# Patient Record
Sex: MALE | Race: WHITE | NOT HISPANIC OR LATINO | URBAN - METROPOLITAN AREA
[De-identification: names, ages, dates, MRNs, and addresses within clinical notes are randomized per-mention and may not be internally consistent; named-entity substitution may affect disease eponyms.]

---

## 2021-04-21 ENCOUNTER — OUTPATIENT (OUTPATIENT)
Dept: OUTPATIENT SERVICES | Facility: HOSPITAL | Age: 61
LOS: 1 days | End: 2021-04-21
Payer: COMMERCIAL

## 2021-04-21 VITALS
SYSTOLIC BLOOD PRESSURE: 127 MMHG | TEMPERATURE: 98 F | OXYGEN SATURATION: 99 % | HEIGHT: 71 IN | DIASTOLIC BLOOD PRESSURE: 78 MMHG | RESPIRATION RATE: 16 BRPM | HEART RATE: 54 BPM | WEIGHT: 195.11 LBS

## 2021-04-21 DIAGNOSIS — Z98.1 ARTHRODESIS STATUS: Chronic | ICD-10-CM

## 2021-04-21 DIAGNOSIS — Z98.890 OTHER SPECIFIED POSTPROCEDURAL STATES: Chronic | ICD-10-CM

## 2021-04-21 DIAGNOSIS — Z29.9 ENCOUNTER FOR PROPHYLACTIC MEASURES, UNSPECIFIED: ICD-10-CM

## 2021-04-21 DIAGNOSIS — Z01.818 ENCOUNTER FOR OTHER PREPROCEDURAL EXAMINATION: ICD-10-CM

## 2021-04-21 DIAGNOSIS — Z90.89 ACQUIRED ABSENCE OF OTHER ORGANS: Chronic | ICD-10-CM

## 2021-04-21 DIAGNOSIS — M25.512 PAIN IN LEFT SHOULDER: ICD-10-CM

## 2021-04-21 LAB
A1C WITH ESTIMATED AVERAGE GLUCOSE RESULT: 5.5 % — SIGNIFICANT CHANGE UP (ref 4–5.6)
ALBUMIN SERPL ELPH-MCNC: 3.8 G/DL — SIGNIFICANT CHANGE UP (ref 3.3–5)
ANION GAP SERPL CALC-SCNC: 5 MMOL/L — SIGNIFICANT CHANGE UP (ref 5–17)
BASOPHILS # BLD AUTO: 0.05 K/UL — SIGNIFICANT CHANGE UP (ref 0–0.2)
BASOPHILS NFR BLD AUTO: 1.1 % — SIGNIFICANT CHANGE UP (ref 0–2)
BUN SERPL-MCNC: 6 MG/DL — LOW (ref 7–23)
CALCIUM SERPL-MCNC: 8.6 MG/DL — SIGNIFICANT CHANGE UP (ref 8.5–10.1)
CHLORIDE SERPL-SCNC: 98 MMOL/L — SIGNIFICANT CHANGE UP (ref 96–108)
CO2 SERPL-SCNC: 28 MMOL/L — SIGNIFICANT CHANGE UP (ref 22–31)
CREAT SERPL-MCNC: 0.78 MG/DL — SIGNIFICANT CHANGE UP (ref 0.5–1.3)
EOSINOPHIL # BLD AUTO: 0.13 K/UL — SIGNIFICANT CHANGE UP (ref 0–0.5)
EOSINOPHIL NFR BLD AUTO: 3 % — SIGNIFICANT CHANGE UP (ref 0–6)
ESTIMATED AVERAGE GLUCOSE: 111 MG/DL — SIGNIFICANT CHANGE UP (ref 68–114)
GLUCOSE SERPL-MCNC: 92 MG/DL — SIGNIFICANT CHANGE UP (ref 70–99)
HCT VFR BLD CALC: 40.4 % — SIGNIFICANT CHANGE UP (ref 39–50)
HGB BLD-MCNC: 13.6 G/DL — SIGNIFICANT CHANGE UP (ref 13–17)
IMM GRANULOCYTES NFR BLD AUTO: 0.2 % — SIGNIFICANT CHANGE UP (ref 0–1.5)
INR BLD: 0.96 RATIO — SIGNIFICANT CHANGE UP (ref 0.88–1.16)
LYMPHOCYTES # BLD AUTO: 1.17 K/UL — SIGNIFICANT CHANGE UP (ref 1–3.3)
LYMPHOCYTES # BLD AUTO: 26.6 % — SIGNIFICANT CHANGE UP (ref 13–44)
MCHC RBC-ENTMCNC: 30.3 PG — SIGNIFICANT CHANGE UP (ref 27–34)
MCHC RBC-ENTMCNC: 33.7 GM/DL — SIGNIFICANT CHANGE UP (ref 32–36)
MCV RBC AUTO: 90 FL — SIGNIFICANT CHANGE UP (ref 80–100)
MONOCYTES # BLD AUTO: 0.56 K/UL — SIGNIFICANT CHANGE UP (ref 0–0.9)
MONOCYTES NFR BLD AUTO: 12.7 % — SIGNIFICANT CHANGE UP (ref 2–14)
NEUTROPHILS # BLD AUTO: 2.48 K/UL — SIGNIFICANT CHANGE UP (ref 1.8–7.4)
NEUTROPHILS NFR BLD AUTO: 56.4 % — SIGNIFICANT CHANGE UP (ref 43–77)
PLATELET # BLD AUTO: 293 K/UL — SIGNIFICANT CHANGE UP (ref 150–400)
POTASSIUM SERPL-MCNC: 4.5 MMOL/L — SIGNIFICANT CHANGE UP (ref 3.5–5.3)
POTASSIUM SERPL-SCNC: 4.5 MMOL/L — SIGNIFICANT CHANGE UP (ref 3.5–5.3)
PROTHROM AB SERPL-ACNC: 11.3 SEC — SIGNIFICANT CHANGE UP (ref 10.6–13.6)
RBC # BLD: 4.49 M/UL — SIGNIFICANT CHANGE UP (ref 4.2–5.8)
RBC # FLD: 12.3 % — SIGNIFICANT CHANGE UP (ref 10.3–14.5)
SODIUM SERPL-SCNC: 131 MMOL/L — LOW (ref 135–145)
WBC # BLD: 4.4 K/UL — SIGNIFICANT CHANGE UP (ref 3.8–10.5)
WBC # FLD AUTO: 4.4 K/UL — SIGNIFICANT CHANGE UP (ref 3.8–10.5)

## 2021-04-21 PROCEDURE — 71046 X-RAY EXAM CHEST 2 VIEWS: CPT

## 2021-04-21 PROCEDURE — 93005 ELECTROCARDIOGRAM TRACING: CPT

## 2021-04-21 PROCEDURE — 83036 HEMOGLOBIN GLYCOSYLATED A1C: CPT

## 2021-04-21 PROCEDURE — 87641 MR-STAPH DNA AMP PROBE: CPT

## 2021-04-21 PROCEDURE — 36415 COLL VENOUS BLD VENIPUNCTURE: CPT

## 2021-04-21 PROCEDURE — 87640 STAPH A DNA AMP PROBE: CPT

## 2021-04-21 PROCEDURE — 85610 PROTHROMBIN TIME: CPT

## 2021-04-21 PROCEDURE — 82040 ASSAY OF SERUM ALBUMIN: CPT

## 2021-04-21 PROCEDURE — 85025 COMPLETE CBC W/AUTO DIFF WBC: CPT

## 2021-04-21 PROCEDURE — 86901 BLOOD TYPING SEROLOGIC RH(D): CPT

## 2021-04-21 PROCEDURE — 93010 ELECTROCARDIOGRAM REPORT: CPT

## 2021-04-21 PROCEDURE — 80048 BASIC METABOLIC PNL TOTAL CA: CPT

## 2021-04-21 PROCEDURE — 86850 RBC ANTIBODY SCREEN: CPT

## 2021-04-21 PROCEDURE — 71046 X-RAY EXAM CHEST 2 VIEWS: CPT | Mod: 26

## 2021-04-21 PROCEDURE — G0463: CPT | Mod: 25

## 2021-04-21 PROCEDURE — 86900 BLOOD TYPING SEROLOGIC ABO: CPT

## 2021-04-21 NOTE — H&P PST ADULT - NSICDXPASTSURGICALHX_GEN_ALL_CORE_FT
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

## 2021-04-21 NOTE — H&P PST ADULT - HEALTH CARE MAINTENANCE
covid vaccine   [place] 14 days Pt denies  travel internationally for the past 21 days

## 2021-04-21 NOTE — H&P PST ADULT - HISTORY OF PRESENT ILLNESS
61 year old male with OA left s/p left TSR 2006 and revision 2009 ; Pt c/o loose hardware on left shoulder as well as pain and limited ROM; he presents to PST for planned Revision of Left total shoulder replacement

## 2021-04-21 NOTE — H&P PST ADULT - ASSESSMENT
61 year old male with OA left s/p left TSR  and revision  ; Pt c/o loose hardware on left shoulder as well as pain and limited ROM; he presents to PST for planned Revision of Left total shoulder replacement     Plan:  1. PST instructions given ; NPO status instructions to be given by ASU   2. Pt instructed to take following meds with sip of water : none   3. Pt instructed to take routine evening medications unless indicated   4. Stop NSAIDS ( Aspirin Alev Motrin Mobic Diclofenac), herbal supplements , MVI , Vitamin fish oil 7 days prior to surgery  unless   directed by surgeon or cardiologist;   5. Medical Optimization  with Dr Anup Barrera   6. EZ wash instructions given & mupirocin instructions given  7. Labs EKG CXR as per surgeon request   8. Pt instructed to self quarantine after Covid test   9. Covid Testing scheduled Pt notified and aware  10. Pt denies covid symptoms shortness of breath fever cough       CAPRINI SCORE [CLOT]    AGE RELATED RISK FACTORS                                                       MOBILITY RELATED FACTORS  [ ] Age 41-60 years                                            (1 Point)                  [ ] Bed rest                                                        (1 Point)  [ x] Age: 61-74 years                                           (2 Points)                 [ ] Plaster cast                                                   (2 Points)  [ ] Age= 75 years                                              (3 Points)                 [ ] Bed bound for more than 72 hours                 (2 Points)    DISEASE RELATED RISK FACTORS                                               GENDER SPECIFIC FACTORS  [ ] Edema in the lower extremities                       (1 Point)                  [ ] Pregnancy                                                     (1 Point)  [ ] Varicose veins                                               (1 Point)                  [ ] Post-partum < 6 weeks                                   (1 Point)             [ x] BMI > 25 Kg/m2                                            (1 Point)                  [ ] Hormonal therapy  or oral contraception          (1 Point)                 [ ] Sepsis (in the previous month)                        (1 Point)                  [ ] History of pregnancy complications                 (1 point)  [ ] Pneumonia or serious lung disease                                               [ ] Unexplained or recurrent                     (1 Point)           (in the previous month)                               (1 Point)  [ ] Abnormal pulmonary function test                     (1 Point)                 SURGERY RELATED RISK FACTORS  [ ] Acute myocardial infarction                              (1 Point)                 [ ]  Section                                             (1 Point)  [ ] Congestive heart failure (in the previous month)  (1 Point)               [ ] Minor surgery                                                  (1 Point)   [ ] Inflammatory bowel disease                             (1 Point)                 [ ] Arthroscopic surgery                                        (2 Points)  [ ] Central venous access                                      (2 Points)                [ ] General surgery lasting more than 45 minutes   (2 Points)       [ ] Stroke (in the previous month)                          (5 Points)               [ x] Elective arthroplasty                                         (5 Points)            ( ) presents and past malignancy                           (2 points)                                                                                                         HEMATOLOGY RELATED FACTORS                                                 TRAUMA RELATED RISK FACTORS  [ ] Prior episodes of VTE                                     (3 Points)                 [ ] Fracture of the hip, pelvis, or leg                       (5 Points)  [ ] Positive family history for VTE                         (3 Points)                 [ ] Acute spinal cord injury (in the previous month)  (5 Points)  [ ] Prothrombin 52713 A                                     (3 Points)                 [ ] Paralysis  (less than 1 month)                             (5 Points)  [ ] Factor V Leiden                                             (3 Points)                  [ ] Multiple Trauma within 1 month                        (5 Points)  [ ] Lupus anticoagulants                                     (3 Points)                                                           [ ] Anticardiolipin antibodies                               (3 Points)                                                       [ ] High homocysteine in the blood                      (3 Points)                                             [ ] Other congenital or acquired thrombophilia      (3 Points)                                                [ ] Heparin induced thrombocytopenia                  (3 Points)                                          Total Score [      8    ]

## 2021-04-22 DIAGNOSIS — Z01.818 ENCOUNTER FOR OTHER PREPROCEDURAL EXAMINATION: ICD-10-CM

## 2021-04-22 DIAGNOSIS — M25.512 PAIN IN LEFT SHOULDER: ICD-10-CM

## 2021-04-22 PROBLEM — M51.26 OTHER INTERVERTEBRAL DISC DISPLACEMENT, LUMBAR REGION: Chronic | Status: ACTIVE | Noted: 2021-04-21

## 2021-04-22 PROBLEM — M19.90 UNSPECIFIED OSTEOARTHRITIS, UNSPECIFIED SITE: Chronic | Status: ACTIVE | Noted: 2021-04-21

## 2021-04-22 LAB
MRSA PCR RESULT.: SIGNIFICANT CHANGE UP
S AUREUS DNA NOSE QL NAA+PROBE: SIGNIFICANT CHANGE UP

## 2021-04-27 ENCOUNTER — APPOINTMENT (OUTPATIENT)
Dept: DISASTER EMERGENCY | Facility: CLINIC | Age: 61
End: 2021-04-27

## 2021-04-27 DIAGNOSIS — Z01.818 ENCOUNTER FOR OTHER PREPROCEDURAL EXAMINATION: ICD-10-CM

## 2021-04-28 LAB — SARS-COV-2 N GENE NPH QL NAA+PROBE: NOT DETECTED

## 2021-04-30 ENCOUNTER — INPATIENT (INPATIENT)
Facility: HOSPITAL | Age: 61
LOS: 0 days | Discharge: HOME | DRG: 483 | End: 2021-05-01
Attending: ORTHOPAEDIC SURGERY | Admitting: ORTHOPAEDIC SURGERY
Payer: COMMERCIAL

## 2021-04-30 ENCOUNTER — RESULT REVIEW (OUTPATIENT)
Age: 61
End: 2021-04-30

## 2021-04-30 ENCOUNTER — TRANSCRIPTION ENCOUNTER (OUTPATIENT)
Age: 61
End: 2021-04-30

## 2021-04-30 VITALS
RESPIRATION RATE: 16 BRPM | WEIGHT: 195.11 LBS | HEIGHT: 61 IN | SYSTOLIC BLOOD PRESSURE: 120 MMHG | DIASTOLIC BLOOD PRESSURE: 82 MMHG | OXYGEN SATURATION: 98 % | TEMPERATURE: 97 F | HEART RATE: 51 BPM

## 2021-04-30 DIAGNOSIS — Z98.890 OTHER SPECIFIED POSTPROCEDURAL STATES: Chronic | ICD-10-CM

## 2021-04-30 DIAGNOSIS — Z98.1 ARTHRODESIS STATUS: Chronic | ICD-10-CM

## 2021-04-30 DIAGNOSIS — M25.512 PAIN IN LEFT SHOULDER: ICD-10-CM

## 2021-04-30 DIAGNOSIS — Z90.89 ACQUIRED ABSENCE OF OTHER ORGANS: Chronic | ICD-10-CM

## 2021-04-30 LAB
ANION GAP SERPL CALC-SCNC: 4 MMOL/L — LOW (ref 5–17)
BUN SERPL-MCNC: 8 MG/DL — SIGNIFICANT CHANGE UP (ref 7–23)
CALCIUM SERPL-MCNC: 8.7 MG/DL — SIGNIFICANT CHANGE UP (ref 8.5–10.1)
CHLORIDE SERPL-SCNC: 105 MMOL/L — SIGNIFICANT CHANGE UP (ref 96–108)
CO2 SERPL-SCNC: 26 MMOL/L — SIGNIFICANT CHANGE UP (ref 22–31)
CREAT SERPL-MCNC: 0.69 MG/DL — SIGNIFICANT CHANGE UP (ref 0.5–1.3)
GLUCOSE SERPL-MCNC: 97 MG/DL — SIGNIFICANT CHANGE UP (ref 70–99)
HCT VFR BLD CALC: 34.6 % — LOW (ref 39–50)
HGB BLD-MCNC: 11.6 G/DL — LOW (ref 13–17)
MCHC RBC-ENTMCNC: 30.2 PG — SIGNIFICANT CHANGE UP (ref 27–34)
MCHC RBC-ENTMCNC: 33.5 GM/DL — SIGNIFICANT CHANGE UP (ref 32–36)
MCV RBC AUTO: 90.1 FL — SIGNIFICANT CHANGE UP (ref 80–100)
PLATELET # BLD AUTO: 226 K/UL — SIGNIFICANT CHANGE UP (ref 150–400)
POTASSIUM SERPL-MCNC: 4.6 MMOL/L — SIGNIFICANT CHANGE UP (ref 3.5–5.3)
POTASSIUM SERPL-SCNC: 4.6 MMOL/L — SIGNIFICANT CHANGE UP (ref 3.5–5.3)
RBC # BLD: 3.84 M/UL — LOW (ref 4.2–5.8)
RBC # FLD: 12.3 % — SIGNIFICANT CHANGE UP (ref 10.3–14.5)
SODIUM SERPL-SCNC: 135 MMOL/L — SIGNIFICANT CHANGE UP (ref 135–145)
WBC # BLD: 7.57 K/UL — SIGNIFICANT CHANGE UP (ref 3.8–10.5)
WBC # FLD AUTO: 7.57 K/UL — SIGNIFICANT CHANGE UP (ref 3.8–10.5)

## 2021-04-30 PROCEDURE — 99223 1ST HOSP IP/OBS HIGH 75: CPT

## 2021-04-30 PROCEDURE — 73030 X-RAY EXAM OF SHOULDER: CPT | Mod: 26,LT

## 2021-04-30 PROCEDURE — 73030 X-RAY EXAM OF SHOULDER: CPT | Mod: LT

## 2021-04-30 PROCEDURE — 88305 TISSUE EXAM BY PATHOLOGIST: CPT

## 2021-04-30 PROCEDURE — 23473 REVIS RECONST SHOULDER JOINT: CPT | Mod: AS

## 2021-04-30 PROCEDURE — C1889: CPT

## 2021-04-30 PROCEDURE — 80048 BASIC METABOLIC PNL TOTAL CA: CPT

## 2021-04-30 PROCEDURE — C1776: CPT

## 2021-04-30 PROCEDURE — C9399: CPT

## 2021-04-30 PROCEDURE — 88305 TISSUE EXAM BY PATHOLOGIST: CPT | Mod: 26

## 2021-04-30 PROCEDURE — 85027 COMPLETE CBC AUTOMATED: CPT

## 2021-04-30 PROCEDURE — 86769 SARS-COV-2 COVID-19 ANTIBODY: CPT

## 2021-04-30 PROCEDURE — 36415 COLL VENOUS BLD VENIPUNCTURE: CPT

## 2021-04-30 PROCEDURE — 99253 IP/OBS CNSLTJ NEW/EST LOW 45: CPT

## 2021-04-30 PROCEDURE — 97161 PT EVAL LOW COMPLEX 20 MIN: CPT | Mod: GP

## 2021-04-30 PROCEDURE — 82962 GLUCOSE BLOOD TEST: CPT

## 2021-04-30 RX ORDER — POLYETHYLENE GLYCOL 3350 17 G/17G
17 POWDER, FOR SOLUTION ORAL
Qty: 1 | Refills: 0
Start: 2021-04-30 | End: 2021-05-13

## 2021-04-30 RX ORDER — SODIUM CHLORIDE 9 MG/ML
1000 INJECTION, SOLUTION INTRAVENOUS
Refills: 0 | Status: DISCONTINUED | OUTPATIENT
Start: 2021-04-30 | End: 2021-04-30

## 2021-04-30 RX ORDER — PREGABALIN 225 MG/1
0 CAPSULE ORAL
Qty: 0 | Refills: 0 | DISCHARGE

## 2021-04-30 RX ORDER — PANTOPRAZOLE SODIUM 20 MG/1
40 TABLET, DELAYED RELEASE ORAL ONCE
Refills: 0 | Status: COMPLETED | OUTPATIENT
Start: 2021-04-30 | End: 2021-04-30

## 2021-04-30 RX ORDER — ONDANSETRON 8 MG/1
4 TABLET, FILM COATED ORAL ONCE
Refills: 0 | Status: DISCONTINUED | OUTPATIENT
Start: 2021-04-30 | End: 2021-04-30

## 2021-04-30 RX ORDER — ZINC SULFATE TAB 220 MG (50 MG ZINC EQUIVALENT) 220 (50 ZN) MG
0 TAB ORAL
Qty: 0 | Refills: 0 | DISCHARGE

## 2021-04-30 RX ORDER — FENTANYL CITRATE 50 UG/ML
50 INJECTION INTRAVENOUS
Refills: 0 | Status: DISCONTINUED | OUTPATIENT
Start: 2021-04-30 | End: 2021-04-30

## 2021-04-30 RX ORDER — OXYCODONE HYDROCHLORIDE 5 MG/1
1 TABLET ORAL
Qty: 30 | Refills: 0
Start: 2021-04-30 | End: 2021-05-04

## 2021-04-30 RX ORDER — ASPIRIN/CALCIUM CARB/MAGNESIUM 324 MG
1 TABLET ORAL
Qty: 14 | Refills: 0
Start: 2021-04-30 | End: 2021-05-06

## 2021-04-30 RX ORDER — ENOXAPARIN SODIUM 100 MG/ML
40 INJECTION SUBCUTANEOUS
Qty: 7 | Refills: 0
Start: 2021-04-30 | End: 2021-05-06

## 2021-04-30 RX ORDER — ACETAMINOPHEN 500 MG
650 TABLET ORAL ONCE
Refills: 0 | Status: COMPLETED | OUTPATIENT
Start: 2021-04-30 | End: 2021-04-30

## 2021-04-30 RX ORDER — ENOXAPARIN SODIUM 100 MG/ML
40 INJECTION SUBCUTANEOUS DAILY
Refills: 0 | Status: DISCONTINUED | OUTPATIENT
Start: 2021-04-30 | End: 2021-05-01

## 2021-04-30 RX ORDER — CHOLECALCIFEROL (VITAMIN D3) 125 MCG
0 CAPSULE ORAL
Qty: 0 | Refills: 0 | DISCHARGE

## 2021-04-30 RX ORDER — OXYCODONE HYDROCHLORIDE 5 MG/1
5 TABLET ORAL EVERY 4 HOURS
Refills: 0 | Status: DISCONTINUED | OUTPATIENT
Start: 2021-04-30 | End: 2021-05-01

## 2021-04-30 RX ORDER — ACETAMINOPHEN 500 MG
1000 TABLET ORAL ONCE
Refills: 0 | Status: COMPLETED | OUTPATIENT
Start: 2021-04-30 | End: 2021-04-30

## 2021-04-30 RX ORDER — OXYCODONE HYDROCHLORIDE 5 MG/1
10 TABLET ORAL EVERY 4 HOURS
Refills: 0 | Status: DISCONTINUED | OUTPATIENT
Start: 2021-04-30 | End: 2021-05-01

## 2021-04-30 RX ORDER — CEFAZOLIN SODIUM 1 G
2000 VIAL (EA) INJECTION EVERY 8 HOURS
Refills: 0 | Status: COMPLETED | OUTPATIENT
Start: 2021-04-30 | End: 2021-04-30

## 2021-04-30 RX ORDER — OXYCODONE HYDROCHLORIDE 5 MG/1
10 TABLET ORAL ONCE
Refills: 0 | Status: DISCONTINUED | OUTPATIENT
Start: 2021-04-30 | End: 2021-04-30

## 2021-04-30 RX ORDER — ACETAMINOPHEN 500 MG
650 TABLET ORAL EVERY 6 HOURS
Refills: 0 | Status: DISCONTINUED | OUTPATIENT
Start: 2021-04-30 | End: 2021-05-01

## 2021-04-30 RX ORDER — SODIUM CHLORIDE 9 MG/ML
1000 INJECTION, SOLUTION INTRAVENOUS
Refills: 0 | Status: DISCONTINUED | OUTPATIENT
Start: 2021-04-30 | End: 2021-05-01

## 2021-04-30 RX ORDER — ONDANSETRON 8 MG/1
1 TABLET, FILM COATED ORAL
Qty: 20 | Refills: 0
Start: 2021-04-30 | End: 2021-05-04

## 2021-04-30 RX ORDER — PANTOPRAZOLE SODIUM 20 MG/1
1 TABLET, DELAYED RELEASE ORAL
Qty: 30 | Refills: 0
Start: 2021-04-30 | End: 2021-05-29

## 2021-04-30 RX ORDER — OXYCODONE HYDROCHLORIDE 5 MG/1
20 TABLET ORAL ONCE
Refills: 0 | Status: DISCONTINUED | OUTPATIENT
Start: 2021-04-30 | End: 2021-04-30

## 2021-04-30 RX ORDER — CELECOXIB 200 MG/1
200 CAPSULE ORAL ONCE
Refills: 0 | Status: COMPLETED | OUTPATIENT
Start: 2021-04-30 | End: 2021-04-30

## 2021-04-30 RX ADMIN — SODIUM CHLORIDE 75 MILLILITER(S): 9 INJECTION, SOLUTION INTRAVENOUS at 11:08

## 2021-04-30 RX ADMIN — OXYCODONE HYDROCHLORIDE 10 MILLIGRAM(S): 5 TABLET ORAL at 16:35

## 2021-04-30 RX ADMIN — ENOXAPARIN SODIUM 40 MILLIGRAM(S): 100 INJECTION SUBCUTANEOUS at 21:28

## 2021-04-30 RX ADMIN — OXYCODONE HYDROCHLORIDE 10 MILLIGRAM(S): 5 TABLET ORAL at 16:05

## 2021-04-30 RX ADMIN — OXYCODONE HYDROCHLORIDE 10 MILLIGRAM(S): 5 TABLET ORAL at 22:12

## 2021-04-30 RX ADMIN — SODIUM CHLORIDE 75 MILLILITER(S): 9 INJECTION, SOLUTION INTRAVENOUS at 14:29

## 2021-04-30 RX ADMIN — Medication 650 MILLIGRAM(S): at 07:22

## 2021-04-30 RX ADMIN — PANTOPRAZOLE SODIUM 40 MILLIGRAM(S): 20 TABLET, DELAYED RELEASE ORAL at 07:20

## 2021-04-30 RX ADMIN — CELECOXIB 200 MILLIGRAM(S): 200 CAPSULE ORAL at 07:20

## 2021-04-30 RX ADMIN — Medication 100 MILLIGRAM(S): at 16:03

## 2021-04-30 RX ADMIN — OXYCODONE HYDROCHLORIDE 20 MILLIGRAM(S): 5 TABLET ORAL at 07:20

## 2021-04-30 RX ADMIN — CELECOXIB 200 MILLIGRAM(S): 200 CAPSULE ORAL at 07:22

## 2021-04-30 RX ADMIN — OXYCODONE HYDROCHLORIDE 20 MILLIGRAM(S): 5 TABLET ORAL at 07:22

## 2021-04-30 RX ADMIN — Medication 400 MILLIGRAM(S): at 23:08

## 2021-04-30 RX ADMIN — OXYCODONE HYDROCHLORIDE 10 MILLIGRAM(S): 5 TABLET ORAL at 21:28

## 2021-04-30 RX ADMIN — Medication 100 MILLIGRAM(S): at 23:00

## 2021-04-30 RX ADMIN — Medication 1000 MILLIGRAM(S): at 23:23

## 2021-04-30 RX ADMIN — Medication 650 MILLIGRAM(S): at 07:20

## 2021-04-30 NOTE — DISCHARGE NOTE PROVIDER - CARE PROVIDER_API CALL
Mahesh Dumont)  Orthopaedic Surgery  [address] [place] [address] [place] [address]  Phone: [phone]  Fax: [phone]  Follow Up Time:

## 2021-04-30 NOTE — BRIEF OPERATIVE NOTE - OPERATION/FINDINGS
L TSA glenoid loosening  Explantation of glenoid component, bone graft, implantation of new humeral head  Conversion to hemiarthroplasty with bone graft on glenoid

## 2021-04-30 NOTE — CONSULT NOTE ADULT - SUBJECTIVE AND OBJECTIVE BOX
HPI:  Patient is a 61y old  Male who presents with a chief complaint of left shoulder pain s/p left total shoulder revision/hemiarthroplasty 21    Consulted by  for VTE prophylaxis, risk stratification, and anticoagulation management.    PAST MEDICAL & SURGICAL HISTORY:  Arthritis    Lumbar herniated disc  cervical herniated disc    History of reverse total replacement of left shoulder joint  2006 revision 2009    History of fusion of cervical spine  2004    History of lumbar spinal fusion    History of thoracic spinal fusion    History of arthroscopy of right shoulder    H/O arthroscopy of right knee    History of tonsillectomy    Interval History:  21: Patient seen at bedside in PACU. Awakened, and arousable abelk to communicate effectively and appropriately. Has history of two shoulder surgeries- total shoulder then revision.  He does not recall which type of AC he had for past two shoulder surgeries but verbalizes understanding of necessity for this during the first 7 days post-op. He is able to move fingers and has sensation to mid forearm.   Denies any bleeding or clotting in the past. Denied any family history of VTE as well.     BMI: 36.9    CrCl: 142.3    Incision Time:0817  Procedure End Time:1014  EBL:200ml    Caprini VTE Risk Score:  CAPRINI SCORE  AGE RELATED RISK FACTORS                                                       MOBILITY RELATED FACTORS  [x ] Age 41-60 years                                            (1 Point)                  [ ] Bed rest                                                        (1 Point)  [ ] Age: 61-74 years                                           (2 Points)                [ ] Plaster cast                                                   (2 Points)  [ ] Age= 75 years                                              (3 Points)                 [ ] Bed bound for more than 72 hours                   (2 Points)    DISEASE RELATED RISK FACTORS                                               GENDER SPECIFIC FACTORS  [ ] Edema in the lower extremities                       (1 Point)           [ ] Pregnancy                                                            (1 Point)  [ ] Varicose veins                                               (1 Point)                  [ ] Post-partum < 6 weeks                                      (1 Point)             [x ] BMI > 25 Kg/m2                                            (1 Point)                  [ ] Hormonal therapy or oral contraception       (1 Point)                 [ ] Sepsis (in the previous month)                        (1 Point)             [ ] History of pregnancy complications                (1Point)  [ ] Pneumonia or serious lung disease                                             [ ] Unexplained or recurrent  (=/>3), premature                                 (In the previous month)                               (1 Point)                birth with toxemia or growth-restricted infant (1 Point)  [ ] Abnormal pulmonary function test            (1 Point)                                   SURGERY RELATED RISK FACTORS  [ ] Acute myocardial infarction                       (1 Point)                  [ ]  Section                                         (1 Point)  [ ] Congestive heart failure (in the previous month) (1 Point)   [ ] Minor surgery   lasting <45 minutes       (1 Point)   [ ] Inflammatory bowel disease                             (1 Point)          [ ] Arthroscopic surgery                                  (2 Points)  [ ] Central venous access                                    (2 Points)            [x ] General surgery lasting >45 minutes      (2 Points)       [ ] Stroke (in the previous month)                  (5 Points)            [ ] Elective major lower extremity arthroplasty (5 Points)                                   [  ] Malignancy (present or past include skin melanoma                                          but exclude  basal skin cell)    (2 points)                                      TRAUMA RELATED RISK FACTORS                HEMATOLOGY RELATED FACTORS                                  [ ] Fracture of the hip, pelvis, or leg                       (5 Points)  [ ] Prior episodes of VTE                                     (3 Points)          [ ] Acute spinal cord injury (in the previous month)  (5 Points)  [ ] Positive family history for VTE                         (3 Points)       [ ] Paralysis (less than 1 month)                          (5 Points)  [ ] Prothrombin 16975 A                                      (3 Points)         [ ] Multiple Trauma (within 1month)                 (5Points)                                                                                                                                                                [ ] Factor V Leiden                                          (3 Points)                                OTHER RISK FACTORS                          [ ] Lupus anticoagulants                                     (3 Points)                       [ ] BMI > 40                          (1 Point)                                                         [ ] Anticardiolipin antibodies                                (3 Points)                   [ ] Smoking                              (1Point)                                                [ ] High homocysteine in the blood                      (3 Points)                [  ] Diabetes requiring insulin (1point)                         [ ] Other congenital or acquired thrombophilia       (3 Points)          [  ] Chemotherapy                   (1 Point)  [ ] Heparin induced thrombocytopenia                  (3 Points)             [  ] Blood Transfusion                (1 point)                                                                                                             Total Score [     4     ]                                                                                                                                                                                                                                                                                                                                                                                                                                           IMPROVE Bleeding Risk Score:1.5      Falls Risk:   High (  )  Mod ( x )  Low (  )      FAMILY HISTORY:  FH: hypertension (Father)      Denies any personal or familial history of clotting or bleeding disorders.    Allergies    No Known Allergies    Intolerances        REVIEW OF SYSTEMS    (  )Fever	        (  )Constipation	(  )SOB				  (  )Headache   (  )Dysuria  (  )Chills	        (  )Melena	        (  )Dyspnea on exertion (  )Dizziness    (  )Polyuria  (  )Nausea      (  )Hematochezia	(  )Cough                          (  )Syncope      (  )Hematuria  (  )Vomiting   (  )Chest Pain	        (  )Wheezing			  (  )Weakness  (  )Diarrhea    (  )Palpitations	(  )Anorexia			  ( x )Myalgia       (   ) Arthralgia    Pertinent positives in HPI and daily subjective. All other systems negative.    Vital Signs Last 24 Hrs  T(C): 36.7 (2021 12:40), Max: 36.7 (2021 12:40)  T(F): 98 (2021 12:40), Max: 98 (2021 12:40)  HR: 50 (2021 12:40) (41 - 51)  BP: 104/62 (2021 12:40) (83/51 - 120/82)  BP(mean): --  RR: 15 (2021 12:40) (10 - 16)  SpO2: 94% (2021 12:40) (94% - 100%)      PHYSICAL EXAM:    Constitutional: Appears Well    Neurological: A& O x 3    Skin: Warm    Respiratory and Thorax: normal effort; Breath sounds: normal; No rales/wheezing/rhonchi  	  Cardiovascular: S1, S2, regular, NMBR	    Gastrointestinal: BS + x 4Q, nontender	    Genitourinary:  Bladder nondistended, nontender    Musculoskeletal:   General Right:   no muscle/joint tenderness,   normal tone, no joint swelling,   ROM: full	    General Left:   no muscle/joint tenderness,   normal tone, no joint swelling,   ROM: limited    Shoulder:  Lt: Drsing CDI; Sling/immob. noted; Cap refill good; Radial pulse +; Sensation intact    Lower extrems:   Right: no calf tenderness              negative leatha's sign               + pedal pulses    Left:   no calf tenderness              negative leatha's sign               + pedal pulses                          11.6   7.57  )-----------( 226      ( 2021 10:36 )             34.6       04-30    135  |  105  |  8   ----------------------------<  97  4.6   |  26  |  0.69    Ca    8.7      2021 10:36        				    MEDICATIONS  (STANDING):  ceFAZolin   IVPB 2000 milliGRAM(s) IV Intermittent every 8 hours  enoxaparin Injectable 40 milliGRAM(s) SubCutaneous daily  lactated ringers. 1000 milliLiter(s) IV Continuous <Continuous>    **Current DVT Prophylaxis:    LMWH                   (  in hospital)  Heparin SQ           (  )  Coumadin             (  )  Xarelto                  (  )  Eliquis                   (  )  Venodynes           (x  )  Ambulation          (  )  UFH                       (  )  ECASA                   ( upon discharge to home )  Contraindicated  (  )

## 2021-04-30 NOTE — CONSULT NOTE ADULT - ATTENDING COMMENTS
pt seen and examined with NP Susy Hart. AGree with documentation as above with changes made where appropriate.   pt admitted for left shoulder revision. Doing well. likely dc in am if pain controlled

## 2021-04-30 NOTE — CONSULT NOTE ADULT - ASSESSMENT
This is a 61 year old male s/p left shoulder revision/hemiarthroplasty with moderate risk for VTE due to age, BMI, and moderately impaired mobility. He is low risk for bleeding.    Plan:  ::enoxaparin 40 mg SQ daily while in hospital then  ::Upon d/c to home: ECASA 325mg PO twice daily x 7 days  ::Daily CBC/BMP  ::Venodynes b/l  ::Enc ambulation    Thank you for this consult, will continue to follow.  Dispo: Home

## 2021-04-30 NOTE — CONSULT NOTE ADULT - SUBJECTIVE AND OBJECTIVE BOX
PCP:  Dr Anup Barrera    CHIEF COMPLAINT:  inc left shoulder pain    HISTORY OF THE PRESENT ILLNESS: this is a 60 yo male with PMH: HTN, HLD, opioid dependence, lumbar/cervical herniated discs and OA of the left shoulder.  he has a h/o left TSR in 2006 and a revision in 2009, now with loose hardware, with inc pain and limited ROM affecting his activities of daily living.  He has opted for revision and is now in PACU s/p left TSR revision.   Pt is seen in PACU, IN NAD, denies any CP or SOB.  We are consulted for medical management    PAST MEDICAL HISTORY: as above    PAST SURGICAL HISTORY: left TSR 2006/2009, lumbar/ thoracic/ cervical spine fusions, tonsillectomy    FAMILY HISTORY:   father Bone Ca, HTN, Mother HTN, OA  SOCIAL HISTORY:  no smoking, social alcohol, no drugs    ALLERGIES:NKDA    HOME MEDS: multi vitamin supplements    REVIEW OF SYSTEMS:   All 10 systems reviewed in detailed and found to be negative with the exception of what has already been described above    MEDICATIONS  (STANDING):  ceFAZolin   IVPB 2000 milliGRAM(s) IV Intermittent every 8 hours  enoxaparin Injectable 40 milliGRAM(s) SubCutaneous daily  lactated ringers. 1000 milliLiter(s) (75 mL/Hr) IV Continuous <Continuous>    MEDICATIONS  (PRN):  acetaminophen   Tablet .. 650 milliGRAM(s) Oral every 6 hours PRN Mild Pain (1 - 3)  oxyCODONE    IR 10 milliGRAM(s) Oral every 4 hours PRN Severe Pain (7 - 10)  oxyCODONE    IR 5 milliGRAM(s) Oral every 4 hours PRN Moderate Pain (4 - 6)      VITALS:  T(F): 98 (04-30-21 @ 12:40), Max: 98 (04-30-21 @ 12:40)  HR: 50 (04-30-21 @ 12:40) (41 - 51)  BP: 104/62 (04-30-21 @ 12:40) (83/51 - 120/82)  RR: 15 (04-30-21 @ 12:40) (10 - 16)  SpO2: 94% (04-30-21 @ 12:40) (94% - 100%)  Wt(kg): --    I&O's Summary    30 Apr 2021 07:01  -  30 Apr 2021 13:06  --------------------------------------------------------  IN: 1650 mL / OUT: 0 mL / NET: 1650 mL        CAPILLARY BLOOD GLUCOSE      POCT Blood Glucose.: 100 mg/dL (30 Apr 2021 10:14)  POCT Blood Glucose.: 96 mg/dL (30 Apr 2021 06:53)    MEDICATIONS  (STANDING):  ceFAZolin   IVPB 2000 milliGRAM(s) IV Intermittent every 8 hours  enoxaparin Injectable 40 milliGRAM(s) SubCutaneous daily  lactated ringers. 1000 milliLiter(s) (75 mL/Hr) IV Continuous <Continuous>    MEDICATIONS  (PRN):  acetaminophen   Tablet .. 650 milliGRAM(s) Oral every 6 hours PRN Mild Pain (1 - 3)  oxyCODONE    IR 10 milliGRAM(s) Oral every 4 hours PRN Severe Pain (7 - 10)  oxyCODONE    IR 5 milliGRAM(s) Oral every 4 hours PRN Moderate Pain (4 - 6)    LABS:                        11.6   7.57  )-----------( 226      ( 30 Apr 2021 10:36 )             34.6     04-30    135  |  105  |  8   ----------------------------<  97  4.6   |  26  |  0.69    Ca    8.7      30 Apr 2021 10:36                IMPRESSION: 60 yo male with above pmh a/w:  # left shoulder OA  # S/P left TSR    POD#0  pain control  PT eval  Bowel regimen  IVF's  Finish ABXs  regular diet  PPI  VTE prophylaxis  moniotr CBC/BMP      # Htn/ HLD  does not appear to be on any meds    # Vte prophylaxis  AC consult  ASA  [employer]  [employer] mobility      Thank you for the consult, will follow         PCP:  Dr Anup Barrera    CHIEF COMPLAINT:  inc left shoulder pain    HISTORY OF THE PRESENT ILLNESS: this is a 62 yo male with PMH: opioid dependence back in 2006 and was on subaxone but no longer, chart review states HTN/HLD but pt denies history, , lumbar/cervical herniated discs and OA of the left shoulder.  He has a h/o left TSR in 2006 and a revision in 2009, now with loose hardware, with inc pain and limited ROM affecting his activities of daily living.  He has opted for revision and is now in PACU s/p left TSR revision.   Pt is seen in PACU, IN NAD, denies any CP or SOB.  We are consulted for medical management    PAST MEDICAL HISTORY: as above    PAST SURGICAL HISTORY: left TSR 2006/2009, lumbar/ thoracic/ cervical spine fusions, tonsillectomy    FAMILY HISTORY:   father prostate ca with mets to Bone age 67, HTN, Mother HTN, OA, dementia dec age 87    SOCIAL HISTORY:  no smoking, social alcohol, no drugs    ALLERGIES: NKDA    HOME MEDS: multi vitamin supplements    REVIEW OF SYSTEMS:   All 10 systems reviewed in detailed and found to be negative with the exception of what has already been described above    MEDICATIONS  (STANDING):  ceFAZolin   IVPB 2000 milliGRAM(s) IV Intermittent every 8 hours  enoxaparin Injectable 40 milliGRAM(s) SubCutaneous daily  lactated ringers. 1000 milliLiter(s) (75 mL/Hr) IV Continuous <Continuous>    MEDICATIONS  (PRN):  acetaminophen   Tablet .. 650 milliGRAM(s) Oral every 6 hours PRN Mild Pain (1 - 3)  oxyCODONE    IR 10 milliGRAM(s) Oral every 4 hours PRN Severe Pain (7 - 10)  oxyCODONE    IR 5 milliGRAM(s) Oral every 4 hours PRN Moderate Pain (4 - 6)      VITALS:  T(F): 98 (04-30-21 @ 12:40), Max: 98 (04-30-21 @ 12:40)  HR: 50 (04-30-21 @ 12:40) (41 - 51)  BP: 104/62 (04-30-21 @ 12:40) (83/51 - 120/82)  RR: 15 (04-30-21 @ 12:40) (10 - 16)  SpO2: 94% (04-30-21 @ 12:40) (94% - 100%)  Wt(kg): --    I&O's Summary    30 Apr 2021 07:01  -  30 Apr 2021 13:06  --------------------------------------------------------  IN: 1650 mL / OUT: 0 mL / NET: 1650 mL        CAPILLARY BLOOD GLUCOSE      POCT Blood Glucose.: 100 mg/dL (30 Apr 2021 10:14)  POCT Blood Glucose.: 96 mg/dL (30 Apr 2021 06:53)    MEDICATIONS  (STANDING):  ceFAZolin   IVPB 2000 milliGRAM(s) IV Intermittent every 8 hours  enoxaparin Injectable 40 milliGRAM(s) SubCutaneous daily  lactated ringers. 1000 milliLiter(s) (75 mL/Hr) IV Continuous <Continuous>    MEDICATIONS  (PRN):  acetaminophen   Tablet .. 650 milliGRAM(s) Oral every 6 hours PRN Mild Pain (1 - 3)  oxyCODONE    IR 10 milliGRAM(s) Oral every 4 hours PRN Severe Pain (7 - 10)  oxyCODONE    IR 5 milliGRAM(s) Oral every 4 hours PRN Moderate Pain (4 - 6)    LABS:                        11.6   7.57  )-----------( 226      ( 30 Apr 2021 10:36 )             34.6     04-30    135  |  105  |  8   ----------------------------<  97  4.6   |  26  |  0.69    Ca    8.7      30 Apr 2021 10:36                IMPRESSION: 62 yo male with above pmh a/w:  # left shoulder OA  # S/P left TSR    POD#0  pain control  PT eval  Bowel regimen  IVF's  Finish ABXs  regular diet  PPI  VTE prophylaxis  monitor CBC/BMP      # Vte prophylaxis  AC consult  ASA  [employer]  [employer] mobility    Had both Moderna Covid-19 vaccines, last one 3 weeks ago.     Thank you for the consult, will follow         PCP:  Dr Anup Barrera    CHIEF COMPLAINT:  inc left shoulder pain    HISTORY OF THE PRESENT ILLNESS: this is a 60 yo male with PMH: opioid dependence back in 2006 and was on subaxone but no longer, chart review states HTN/HLD but pt denies history, , lumbar/cervical herniated discs and OA of the left shoulder.  He has a h/o left TSR in 2006 and a revision in 2009, now with loose hardware, with inc pain and limited ROM affecting his activities of daily living.  He has opted for revision and is now in PACU s/p left TSR revision.   Pt is seen in PACU, IN NAD, denies any CP or SOB.  We are consulted for medical management    PAST MEDICAL HISTORY: as above    PAST SURGICAL HISTORY: left TSR 2006/2009, lumbar/ thoracic/ cervical spine fusions, tonsillectomy    FAMILY HISTORY:   father prostate ca with mets to Bone age 67, HTN, Mother HTN, OA, dementia dec age 87    SOCIAL HISTORY:  no smoking, social alcohol, no drugs    ALLERGIES: NKDA    HOME MEDS: multi vitamin supplements    REVIEW OF SYSTEMS:   All 10 systems reviewed in detailed and found to be negative with the exception of what has already been described above    MEDICATIONS  (STANDING):  ceFAZolin   IVPB 2000 milliGRAM(s) IV Intermittent every 8 hours  enoxaparin Injectable 40 milliGRAM(s) SubCutaneous daily  lactated ringers. 1000 milliLiter(s) (75 mL/Hr) IV Continuous <Continuous>    MEDICATIONS  (PRN):  acetaminophen   Tablet .. 650 milliGRAM(s) Oral every 6 hours PRN Mild Pain (1 - 3)  oxyCODONE    IR 10 milliGRAM(s) Oral every 4 hours PRN Severe Pain (7 - 10)  oxyCODONE    IR 5 milliGRAM(s) Oral every 4 hours PRN Moderate Pain (4 - 6)      VITALS:  T(F): 98 (04-30-21 @ 12:40), Max: 98 (04-30-21 @ 12:40)  HR: 50 (04-30-21 @ 12:40) (41 - 51)  BP: 104/62 (04-30-21 @ 12:40) (83/51 - 120/82)  RR: 15 (04-30-21 @ 12:40) (10 - 16)  SpO2: 94% (04-30-21 @ 12:40) (94% - 100%)  Wt(kg): --    I&O's Summary    30 Apr 2021 07:01  -  30 Apr 2021 13:06  --------------------------------------------------------  IN: 1650 mL / OUT: 0 mL / NET: 1650 mL        CAPILLARY BLOOD GLUCOSE      POCT Blood Glucose.: 100 mg/dL (30 Apr 2021 10:14)  POCT Blood Glucose.: 96 mg/dL (30 Apr 2021 06:53)    MEDICATIONS  (STANDING):  ceFAZolin   IVPB 2000 milliGRAM(s) IV Intermittent every 8 hours  enoxaparin Injectable 40 milliGRAM(s) SubCutaneous daily  lactated ringers. 1000 milliLiter(s) (75 mL/Hr) IV Continuous <Continuous>    MEDICATIONS  (PRN):  acetaminophen   Tablet .. 650 milliGRAM(s) Oral every 6 hours PRN Mild Pain (1 - 3)  oxyCODONE    IR 10 milliGRAM(s) Oral every 4 hours PRN Severe Pain (7 - 10)  oxyCODONE    IR 5 milliGRAM(s) Oral every 4 hours PRN Moderate Pain (4 - 6)    LABS:                        11.6   7.57  )-----------( 226      ( 30 Apr 2021 10:36 )             34.6     04-30    135  |  105  |  8   ----------------------------<  97  4.6   |  26  |  0.69    Ca    8.7      30 Apr 2021 10:36                IMPRESSION: 60 yo male with above pmh a/w:  # left shoulder OA  # S/P left TSR    POD#0  pain control  PT eval  Bowel regimen  IVF's  Finish ABXs  regular diet  PPI  VTE prophylaxis  monitor CBC/BMP      # Vte prophylaxis  AC consult  [employer] mobility    Had both Moderna Covid-19 vaccines, last one 3 weeks ago.     Thank you for the consult, will follow         PCP:  Dr Anup Barrera    CHIEF COMPLAINT:  inc left shoulder pain    HISTORY OF THE PRESENT ILLNESS: this is a 60 yo male with PMH: opioid dependence back in 2006 and was on subaxone but no longer, chart review states HTN/HLD but pt denies history, , lumbar/cervical herniated discs and OA of the left shoulder.  He has a h/o left TSR in 2006 and a revision in 2009, now with loose hardware, with inc pain and limited ROM affecting his activities of daily living.  He has opted for revision and is now in PACU s/p left TSR revision.   Pt is seen in PACU, IN NAD, denies any CP or SOB.  We are consulted for medical management    PAST MEDICAL HISTORY: as above    PAST SURGICAL HISTORY: left TSR 2006/2009, lumbar/ thoracic/ cervical spine fusions, tonsillectomy    FAMILY HISTORY:   father prostate ca with mets to Bone age 67, HTN, Mother HTN, OA, dementia dec age 87    SOCIAL HISTORY:  no smoking, social alcohol, no drugs    ALLERGIES: NKDA    HOME MEDS: multi vitamin supplements    REVIEW OF SYSTEMS:   All 10 systems reviewed in detailed and found to be negative with the exception of what has already been described above          VITALS:  T(F): 98 (04-30-21 @ 12:40), Max: 98 (04-30-21 @ 12:40)  HR: 50 (04-30-21 @ 12:40) (41 - 51)  BP: 104/62 (04-30-21 @ 12:40) (83/51 - 120/82)  RR: 15 (04-30-21 @ 12:40) (10 - 16)  SpO2: 94% (04-30-21 @ 12:40) (94% - 100%)    PHYSICAL EXAM:    GENERAL: Comfortable, no acute distress   HEAD:  Normocephalic, atraumatic  EYES: EOMI, PERRLA  HEENT: Moist mucous membranes  NECK: Supple, No JVD  NERVOUS SYSTEM:  Alert & Oriented X3, LUE with block.  CHEST/LUNG: Clear to auscultation bilaterally  HEART: Regular rate and rhythm  ABDOMEN: Soft, Nontender, Nondistended, Bowel sounds present  GENITOURINARY: Voiding, no palpable bladder  EXTREMITIES:   No clubbing, cyanosis, or edema  MUSCULOSKELTAL-LUE in sling.   SKIN-no rash      LABS:                        11.6   7.57  )-----------( 226      ( 30 Apr 2021 10:36 )             34.6     04-30    135  |  105  |  8   ----------------------------<  97  4.6   |  26  |  0.69    Ca    8.7      30 Apr 2021 10:36    MEDICATIONS  (STANDING):  ceFAZolin   IVPB 2000 milliGRAM(s) IV Intermittent every 8 hours  enoxaparin Injectable 40 milliGRAM(s) SubCutaneous daily  lactated ringers. 1000 milliLiter(s) (75 mL/Hr) IV Continuous <Continuous>    MEDICATIONS  (PRN):  acetaminophen   Tablet .. 650 milliGRAM(s) Oral every 6 hours PRN Mild Pain (1 - 3)  oxyCODONE    IR 10 milliGRAM(s) Oral every 4 hours PRN Severe Pain (7 - 10)  oxyCODONE    IR 5 milliGRAM(s) Oral every 4 hours PRN Moderate Pain (4 - 6)      IMPRESSION: 60 yo male with above pmh a/w:  # left shoulder OA  # S/P left TSR    POD#0  pain control  PT eval  Bowel regimen  IVF's  Finish ABXs  regular diet  PPI  VTE prophylaxis  monitor CBC/BMP    #Acute blood loss anemia:  -d /t surgery  -no need for transfusion at this time.       # Vte prophylaxis  AC consult  [employer] mobility    Had both Moderna Covid-19 vaccines, last one 3 weeks ago.     Thank you for the consult, will follow

## 2021-04-30 NOTE — DISCHARGE NOTE PROVIDER - NSDCFUADDINST_GEN_ALL_CORE_FT
Total Shoulder Discharge Instructions     1. Activity: No Aggressive ROM until cleared by Dr. Dumont. Maintain sling at all times EXCEPT to straighten elbow a few times daily. Elevate hand and wiggle fingers. Do not start any outpatient PT until you see Dr. Dumont in the office.    2. Call with fever over 101, wound redness, drainage.    3. Wound care: Do not remove or change dressing unless saturated.  IF so, apply dry gauze, tegaderm. Be careful not to removed mesh that is glued to the wound. There are no sutures or staples to remove.    4. Continue to apply ICE packs.    5. DVT PE Prophylaxis: ECASA 325 BID OR Lovenox for 2 weeks per AC Team. See med Rec.    6. Follow Up: Orthopedics, Dr. DUMONT  10-14 days in office. Call to schedule. If going home, eRx sent to your pharmacy for .    Shoulder Discharge Instructions     1. Activity: No ROM until cleared by Dr. Dumont. Maintain sling at all times EXCEPT to straighten elbow a few times daily. Elevate hand and wiggle fingers. Do not start any outpatient PT until you see Dr. Dumont in the office.    2. Call with fever over 101, wound redness, drainage.    3. Wound care: Do not remove or change dressing unless saturated.  IF so, apply dry gauze, tegaderm. Be careful not to removed mesh that is glued to the wound. There are no sutures or staples to remove.    4. Continue to apply ICE packs.    5. DVT PE Prophylaxis: ECASA 325 BID OR Lovenox for 2 weeks per AC Team. See med Rec.    6. Follow Up: Orthopedics, Dr. DUMONT  10-14 days in office. Call to schedule. If going home, eRx sent to your pharmacy for .    Shoulder Discharge Instructions     1. Activity: No ROM until cleared by Dr. Dumont. Maintain sling at all times EXCEPT to straighten elbow a few times daily. Elevate hand and wiggle fingers. Do not start any outpatient PT until you see Dr. Dumont in the office.    2. Call with fever over 101, wound redness, drainage.    3. Wound care: Do not remove or change dressing unless saturated.  IF so, apply dry gauze, tegaderm. Be careful not to removed mesh that is glued to the wound. There are no sutures or staples to remove.    4. Continue to apply ICE packs.    5. DVT PE Prophylaxis: ECASA 325 per AC Team. See med Rec.    6. Follow Up: Orthopedics, Dr. DUMONT  10-14 days in office. Call to schedule. If going home, eRx sent to your pharmacy for .

## 2021-04-30 NOTE — BRIEF OPERATIVE NOTE - NSICDXBRIEFPROCEDURE_GEN_ALL_CORE_FT
PROCEDURES:  Open revision of nonautologous tissue substitute in left shoulder joint 30-Apr-2021 09:52:14  Gallo Buchanan

## 2021-04-30 NOTE — DISCHARGE NOTE PROVIDER - HOSPITAL COURSE
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 Pt is afebrile with stable vital signs. Pain is controlled. Alert and Oriented. Exam reveals intact AIN/PIN, wrist flexion and wrist extension, 2+Radial Pulse. Dressing is clean and dry with telfa/tegaderm on.    VITALS**  LABS**    Vital Signs Last 24 Hrs  T(C): 36.3 (04-30-21 @ 07:27), Max: 36.3 (04-30-21 @ 07:27)  T(F): 97.3 (04-30-21 @ 07:27), Max: 97.3 (04-30-21 @ 07:27)  HR: 51 (04-30-21 @ 07:27) (51 - 51)  BP: 120/82 (04-30-21 @ 07:27) (120/82 - 120/82)  BP(mean): --  RR: 16 (04-30-21 @ 07:27) (16 - 16)  SpO2: 98% (04-30-21 @ 07:27) (98% - 98%)        Hospital Course:  Patient presented to [facility] medically cleared for Left  Revision Shoulder Replacement Surgery, having failed outpatient non-operative conservative management. Prophylactic antibiotics were started before the procedure and continued for 24 hours. They were admitted after surgery to the orthopedic floor.   There were no complications during the hospital stay.     Routine consults were obtained from the Anticoagulation Team for DVT/PE prophylaxis, from Physical Therapy for twice daily PT ROM limited to full forward elevation, Internal rotation to Chest, external rotation to neutral, no extension. Consult to Hospitalist for Medical Co-management. Patient was placed on EC Aspirin 325 BID for anticoagulation.  Pertinent home medications were continued.  Daily labs were followed.      On POD 0 there were no overnight events and the pt was OOB with PT. POD1 hemovac drain (if present) was removed. POD 1, PT was continued, and on POD 1 or 2 pt is ready today for DC to home or to Rehab. The orthopedic Attending is aware and agrees. 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 Pt is afebrile with stable vital signs. Pain is controlled. Alert and Oriented. Exam reveals intact AIN/PIN, wrist flexion and wrist extension, 2+Radial Pulse. Dressing is clean and dry with telfa/tegaderm on.        Vital Signs Last 24 Hrs  T(C): 36.3 (04-30-21 @ 07:27), Max: 36.3 (04-30-21 @ 07:27)  T(F): 97.3 (04-30-21 @ 07:27), Max: 97.3 (04-30-21 @ 07:27)  HR: 51 (04-30-21 @ 07:27) (51 - 51)  BP: 120/82 (04-30-21 @ 07:27) (120/82 - 120/82)  BP(mean): --  RR: 16 (04-30-21 @ 07:27) (16 - 16)  SpO2: 98% (04-30-21 @ 07:27) (98% - 98%)        Hospital Course:  Patient presented to [facility] medically cleared for Left  Revision Shoulder Replacement Surgery, having failed outpatient non-operative conservative management. Prophylactic antibiotics were started before the procedure and continued for 24 hours. They were admitted after surgery to the orthopedic floor.   There were no complications during the hospital stay.     Routine consults were obtained from the Anticoagulation Team for DVT/PE prophylaxis, from Physical Therapy for twice daily PT ROM limited to full forward elevation, Internal rotation to Chest, external rotation to neutral, no extension. Consult to Hospitalist for Medical Co-management. Patient was placed on EC Aspirin for anticoagulation.  Pertinent home medications were continued.  Daily labs were followed.      On POD 0 there were no overnight events. POD 1, PT was continued, and on POD 1 pt is ready today for DC to home. The orthopedic Attending is aware and agrees.

## 2021-04-30 NOTE — PROGRESS NOTE ADULT - SUBJECTIVE AND OBJECTIVE BOX
Pt comfortable without complaints, pain controlled  Denies CP, SOB, N/V, numbness/tingling     Vital Signs Last 24 Hrs  T(C): 36.3 (04-30-21 @ 10:14), Max: 36.3 (04-30-21 @ 07:27)  T(F): 97.3 (04-30-21 @ 10:14), Max: 97.3 (04-30-21 @ 07:27)  HR: 45 (04-30-21 @ 11:15) (41 - 51)  BP: 94/59 (04-30-21 @ 11:15) (83/51 - 120/82)  BP(mean): --  RR: 10 (04-30-21 @ 11:15) (10 - 16)  SpO2: 100% (04-30-21 @ 11:15) (98% - 100%)    AVSS  General: Pt Alert and oriented, NAD  LUE: DSG C/D/I in sling  Pulses: Hand WWP; Radial pulse 2+; Cap refill < 2 sec  Sensation: SILT and symmetric to contralateral extremity  Motor:  5/5 and symmetric to contralateral extremity               A/P: 61yMale s/p conversion to L shoulder hemiarthroplasty POD 0    - Stable  - Pain Control  - DVT ppx: starting POD 1 per AC recs  - Post op abx: Ancef 2g Q8H x 2 doses  - NWB LUE in sling, no ROM  - PT/OT: pending PT/OT eval  - dispo planning

## 2021-04-30 NOTE — DISCHARGE NOTE PROVIDER - NSDCCPTREATMENT_GEN_ALL_CORE_FT
PRINCIPAL PROCEDURE  Procedure: Revision, total arthroplasty, shoulder, glenoid and humeral components, using allograft if indicated  Findings and Treatment:

## 2021-04-30 NOTE — DISCHARGE NOTE PROVIDER - NSDCMRMEDTOKEN_GEN_ALL_CORE_FT
cat&#x27;s paw:   spirulina:   Vitamin B12:   Vitamin D3:   Zinc:    oxyCODONE 5 mg oral tablet: 1 tab(s) orally every 4 hours, As Needed -for severe pain MDD:8  polyethylene glycol 3350 oral powder for reconstitution: 17 gram(s) orally once a day (at bedtime)  Protonix 40 mg oral delayed release tablet: 1 tab(s) orally once a day   Zofran 4 mg oral tablet: 1 tab(s) orally every 6 hours    Aspirin Enteric Coated 325 mg oral delayed release tablet: 1 tab by mouth twice daily with food x 7 days after surgery.  oxyCODONE 5 mg oral tablet: 1 tab(s) orally every 4 hours, As Needed -for severe pain MDD:8  polyethylene glycol 3350 oral powder for reconstitution: 17 gram(s) orally once a day (at bedtime)  Protonix 40 mg oral delayed release tablet: 1 tab(s) orally once a day   Zofran 4 mg oral tablet: 1 tab(s) orally every 6 hours

## 2021-05-01 ENCOUNTER — TRANSCRIPTION ENCOUNTER (OUTPATIENT)
Age: 61
End: 2021-05-01

## 2021-05-01 VITALS
HEART RATE: 56 BPM | RESPIRATION RATE: 16 BRPM | DIASTOLIC BLOOD PRESSURE: 81 MMHG | SYSTOLIC BLOOD PRESSURE: 118 MMHG | OXYGEN SATURATION: 99 % | TEMPERATURE: 99 F

## 2021-05-01 LAB
ANION GAP SERPL CALC-SCNC: 5 MMOL/L — SIGNIFICANT CHANGE UP (ref 5–17)
BUN SERPL-MCNC: 11 MG/DL — SIGNIFICANT CHANGE UP (ref 7–23)
CALCIUM SERPL-MCNC: 8.1 MG/DL — LOW (ref 8.5–10.1)
CHLORIDE SERPL-SCNC: 101 MMOL/L — SIGNIFICANT CHANGE UP (ref 96–108)
CO2 SERPL-SCNC: 27 MMOL/L — SIGNIFICANT CHANGE UP (ref 22–31)
COVID-19 SPIKE DOMAIN AB INTERP: POSITIVE
COVID-19 SPIKE DOMAIN ANTIBODY RESULT: >250 U/ML — HIGH
CREAT SERPL-MCNC: 0.72 MG/DL — SIGNIFICANT CHANGE UP (ref 0.5–1.3)
GLUCOSE SERPL-MCNC: 87 MG/DL — SIGNIFICANT CHANGE UP (ref 70–99)
HCT VFR BLD CALC: 34.3 % — LOW (ref 39–50)
HGB BLD-MCNC: 11.7 G/DL — LOW (ref 13–17)
MCHC RBC-ENTMCNC: 30.7 PG — SIGNIFICANT CHANGE UP (ref 27–34)
MCHC RBC-ENTMCNC: 34.1 GM/DL — SIGNIFICANT CHANGE UP (ref 32–36)
MCV RBC AUTO: 90 FL — SIGNIFICANT CHANGE UP (ref 80–100)
PLATELET # BLD AUTO: 240 K/UL — SIGNIFICANT CHANGE UP (ref 150–400)
POTASSIUM SERPL-MCNC: 4.2 MMOL/L — SIGNIFICANT CHANGE UP (ref 3.5–5.3)
POTASSIUM SERPL-SCNC: 4.2 MMOL/L — SIGNIFICANT CHANGE UP (ref 3.5–5.3)
RBC # BLD: 3.81 M/UL — LOW (ref 4.2–5.8)
RBC # FLD: 12.4 % — SIGNIFICANT CHANGE UP (ref 10.3–14.5)
SARS-COV-2 IGG+IGM SERPL QL IA: >250 U/ML — HIGH
SARS-COV-2 IGG+IGM SERPL QL IA: POSITIVE
SODIUM SERPL-SCNC: 133 MMOL/L — LOW (ref 135–145)
WBC # BLD: 10.08 K/UL — SIGNIFICANT CHANGE UP (ref 3.8–10.5)
WBC # FLD AUTO: 10.08 K/UL — SIGNIFICANT CHANGE UP (ref 3.8–10.5)

## 2021-05-01 RX ORDER — GABAPENTIN 400 MG/1
100 CAPSULE ORAL THREE TIMES A DAY
Refills: 0 | Status: DISCONTINUED | OUTPATIENT
Start: 2021-05-01 | End: 2021-05-01

## 2021-05-01 RX ORDER — CYCLOBENZAPRINE HYDROCHLORIDE 10 MG/1
10 TABLET, FILM COATED ORAL ONCE
Refills: 0 | Status: COMPLETED | OUTPATIENT
Start: 2021-05-01 | End: 2021-05-01

## 2021-05-01 RX ORDER — HYDROMORPHONE HYDROCHLORIDE 2 MG/ML
0.5 INJECTION INTRAMUSCULAR; INTRAVENOUS; SUBCUTANEOUS ONCE
Refills: 0 | Status: DISCONTINUED | OUTPATIENT
Start: 2021-05-01 | End: 2021-05-01

## 2021-05-01 RX ORDER — KETOROLAC TROMETHAMINE 30 MG/ML
30 SYRINGE (ML) INJECTION ONCE
Refills: 0 | Status: DISCONTINUED | OUTPATIENT
Start: 2021-05-01 | End: 2021-05-01

## 2021-05-01 RX ADMIN — OXYCODONE HYDROCHLORIDE 10 MILLIGRAM(S): 5 TABLET ORAL at 06:00

## 2021-05-01 RX ADMIN — Medication 30 MILLIGRAM(S): at 03:48

## 2021-05-01 RX ADMIN — CYCLOBENZAPRINE HYDROCHLORIDE 10 MILLIGRAM(S): 10 TABLET, FILM COATED ORAL at 07:34

## 2021-05-01 RX ADMIN — OXYCODONE HYDROCHLORIDE 10 MILLIGRAM(S): 5 TABLET ORAL at 09:08

## 2021-05-01 RX ADMIN — OXYCODONE HYDROCHLORIDE 10 MILLIGRAM(S): 5 TABLET ORAL at 05:19

## 2021-05-01 RX ADMIN — Medication 30 MILLIGRAM(S): at 04:03

## 2021-05-01 RX ADMIN — HYDROMORPHONE HYDROCHLORIDE 0.5 MILLIGRAM(S): 2 INJECTION INTRAMUSCULAR; INTRAVENOUS; SUBCUTANEOUS at 04:46

## 2021-05-01 RX ADMIN — OXYCODONE HYDROCHLORIDE 10 MILLIGRAM(S): 5 TABLET ORAL at 01:31

## 2021-05-01 RX ADMIN — HYDROMORPHONE HYDROCHLORIDE 0.5 MILLIGRAM(S): 2 INJECTION INTRAMUSCULAR; INTRAVENOUS; SUBCUTANEOUS at 05:01

## 2021-05-01 RX ADMIN — OXYCODONE HYDROCHLORIDE 10 MILLIGRAM(S): 5 TABLET ORAL at 02:10

## 2021-05-01 NOTE — PROGRESS NOTE ADULT - SUBJECTIVE AND OBJECTIVE BOX
Pt complaining of episodes of breakthrough pain.  Denies CP, SOB, N/V, numbness/tingling     Vital Signs Last 24 Hrs  T(C): 36.9 (01 May 2021 03:33), Max: 36.9 (30 Apr 2021 20:20)  T(F): 98.5 (01 May 2021 03:33), Max: 98.5 (30 Apr 2021 20:20)  HR: 60 (01 May 2021 03:33) (41 - 76)  BP: 135/89 (01 May 2021 03:33) (83/51 - 135/89)  BP(mean): --  RR: 16 (01 May 2021 03:33) (10 - 16)  SpO2: 100% (01 May 2021 03:33) (94% - 100%)    AVSS  General: Pt Alert and oriented, NAD  LUE: DSG C/D/I in sling  Pulses: Hand WWP; Radial pulse 2+; Cap refill < 2 sec  Sensation: SILT and symmetric to contralateral extremity  Motor:  5/5 and symmetric to contralateral extremity               A/P: 61yMale s/p conversion to L shoulder hemiarthroplasty POD 1    - Stable  - Pain Control, having breakthrough pain will add muscle relaxant  - DVT ppx: starting POD 1 per AC recs  - Post op abx: Ancef 2g Q8H x 2 doses  - NWB LUE in sling, no ROM  - PT/OT: pending PT/OT eval  - dispo planning  - ortho stable for home       Pt complaining of episodes of breakthrough pain.  Denies CP, SOB, N/V, numbness/tingling     Vital Signs Last 24 Hrs  T(C): 36.9 (01 May 2021 03:33), Max: 36.9 (30 Apr 2021 20:20)  T(F): 98.5 (01 May 2021 03:33), Max: 98.5 (30 Apr 2021 20:20)  HR: 60 (01 May 2021 03:33) (41 - 76)  BP: 135/89 (01 May 2021 03:33) (83/51 - 135/89)  BP(mean): --  RR: 16 (01 May 2021 03:33) (10 - 16)  SpO2: 100% (01 May 2021 03:33) (94% - 100%)    AVSS  General: Pt Alert and oriented, NAD  LUE: DSG C/D/I in sling  Pulses: Hand WWP; Radial pulse 2+; Cap refill < 2 sec  Sensation: SILT and symmetric to contralateral extremity  Motor:  5/5 and symmetric to contralateral extremity               A/P: 61yMale s/p conversion to L shoulder hemiarthroplasty POD 1    - Stable  - Pain Control, having breakthrough pain will add muscle relaxant  - DVT ppx: starting POD 1 per AC recs  - Post op abx: Ancef 2g Q8H x 2 doses  - NWB LUE in sling, no ROM  - PT/OT: pending PT/OT eval  - dispo planning  - home once clears PT  - ortho stable for home

## 2021-05-01 NOTE — PHYSICAL THERAPY INITIAL EVALUATION ADULT - GENERAL OBSERVATIONS, REHAB EVAL
pt received resting in bed. Agreeable to PT. Per pt, may need a RTSA down the road. R handed. NWB, no ROM educated and observed. No Hx of fall. Pt was able to walk on the hallways and negotiated 4 steps without difficulty. SPO2 99% after activities in RA.

## 2021-05-01 NOTE — PHYSICAL THERAPY INITIAL EVALUATION ADULT - PLANNED THERAPY INTERVENTIONS, PT EVAL
balance training/bed mobility training/gait training/joint mobilization/manual therapy techniques/motor coordination training/neuromuscular re-education/orthotic fitting/training/ROM/strengthening/stretching/transfer training

## 2021-05-01 NOTE — PHYSICAL THERAPY INITIAL EVALUATION ADULT - IMPAIRMENTS FOUND, PT EVAL
fine motor/gait, locomotion, and balance/gross motor/integumentary integrity/joint integrity and mobility/muscle strength/posture/ROM

## 2021-05-01 NOTE — DISCHARGE NOTE NURSING/CASE MANAGEMENT/SOCIAL WORK - PATIENT PORTAL LINK FT
You can access the FollowMyHealth Patient Portal offered by [facility] by registering at the following website: [email or web address]. By joining [employer]’s FollowMyHealth portal, you will also be able to view your health information using other applications (apps) compatible with our system.

## 2021-05-10 DIAGNOSIS — D62 ACUTE POSTHEMORRHAGIC ANEMIA: ICD-10-CM

## 2021-05-10 DIAGNOSIS — T84.038A MECHANICAL LOOSENING OF OTHER INTERNAL PROSTHETIC JOINT, INITIAL ENCOUNTER: ICD-10-CM

## 2021-05-10 DIAGNOSIS — I10 ESSENTIAL (PRIMARY) HYPERTENSION: ICD-10-CM
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
## 2021-05-10 DIAGNOSIS — M51.26 OTHER INTERVERTEBRAL DISC DISPLACEMENT, LUMBAR REGION: ICD-10-CM

## 2021-05-10 DIAGNOSIS — M65.9 SYNOVITIS AND TENOSYNOVITIS, UNSPECIFIED: ICD-10-CM

## 2021-05-10 DIAGNOSIS — M19.012 PRIMARY OSTEOARTHRITIS, LEFT SHOULDER: ICD-10-CM

## 2021-05-10 DIAGNOSIS — E78.5 HYPERLIPIDEMIA, UNSPECIFIED: ICD-10-CM
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
## 2021-05-10 DIAGNOSIS — M50.20 OTHER CERVICAL DISC DISPLACEMENT, UNSPECIFIED CERVICAL REGION: ICD-10-CM

## 2021-05-10 DIAGNOSIS — Z96.612 PRESENCE OF LEFT ARTIFICIAL SHOULDER JOINT: ICD-10-CM

## 2022-06-01 ENCOUNTER — NON-APPOINTMENT (OUTPATIENT)
Age: 62
End: 2022-06-01

## 2022-06-01 ENCOUNTER — APPOINTMENT (OUTPATIENT)
Dept: ORTHOPEDIC SURGERY | Facility: CLINIC | Age: 62
End: 2022-06-01
Payer: COMMERCIAL

## 2022-06-01 PROCEDURE — 99203 OFFICE O/P NEW LOW 30 MIN: CPT

## 2022-06-01 PROCEDURE — 73630 X-RAY EXAM OF FOOT: CPT | Mod: 50

## 2022-06-01 NOTE — DISCUSSION/SUMMARY
[de-identified] : Today I had a lengthy discussion with the patient regarding their left foot onychocryptosis. I have addressed all the patient's concerns surrounding the pathology of their condition. XR imaging was completed in office today and results were reviewed with the patient. We discussed non-operative treatment options in great detail with respect to removal of the toenail to continuance of conservative management. At this time, the patient will continue with conservative management. I did recommend that the patient utilize [place] [place]'s PRN for the onychocryptosis. The patient understood and verbally agreed to the treatment plan. All of their questions were answered and they were satisfied with the visit. The patient should call the office if they have any questions or experience worsening symptoms. I would like to see the patient back in the office in PRN to reassess their condition. 				\par

## 2022-06-01 NOTE — ADDENDUM
[FreeTextEntry1] : I, Argentina Ortiz, acted solely as a scribe for Dr. Duarte Preston on this date 06/01/2022.\par \par All medical record entries made by the Scribe were at my, Dr. Duarte Preston, direction and personally dictated by me on 06/01/2022 . I have reviewed the chart and agree that the record accurately reflects my personal performance of the history, physical exam, assessment and plan. I have also personally directed, reviewed, and agreed with the chart.	\par

## 2022-06-01 NOTE — HISTORY OF PRESENT ILLNESS
[FreeTextEntry1] : The patient is a 62 year old male presenting for an initial evaluation of chronic bilateral foot pain, left greater than right. The patient is concerned with the appearance of an ingrown toenail to his left foot that is painful to touch. 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 He presents today for further evaluation of the same. He is also concerned with the pain to the fifth metatarsal, reporting history of previous fracture. He reports a possible "calcium deposit" to his lateral foot. He has tried topical creams including Mupirocin and Neosporin for this issue. He denies any numbness or tingling sensations to their feet. No other complaints. \par

## 2022-06-01 NOTE — PHYSICAL EXAM
[de-identified] : General: Alert and oriented x3. In no acute distress. Pleasant in nature with a normal affect. No apparent respiratory distress.\par \par Left Foot Exam\par Skin: Clean, dry, intact\par Inspection: Onychocryptosis. Chronically dislocated fifth metatarsal. No obvious malalignment, no masses, no swelling, no effusion\par Pulses: 2+ DP/PT pulses\par ROM: FOOT Full  ROM of digits, ANKLE 10 degrees of dorsiflexion, 40 degrees of plantarflexion, 10 degrees of subtalar motion.\par Painful ROM: None\par Tenderness: No tenderness over the medial malleolus, No tenderness over the lateral malleolus, no CFL/ATFL/PTFL pain, no deltoid ligament pain. No heel pain. No Achilles tenderness. No 5th metatarsal pain. No pain to the LisFranc joint. No ttp over the posterior tibial tendon.\par Stability: Negative anterior/posterior drawer.\par Strength: 5/5 ADD/ABD/TA/GS/EHL/FHL/EDL\par Neuro: Sensation in tact to light touch throughout\par Additional tests: Negative Mortons test, negative tarsal tunnel tinels, negative single heel rise.	 [de-identified] : X-rays of the bilateral feet were ordered, obtained, and reviewed by me today, 06/01/2022, revealed: Chronically dislocated fifth metatarsal. No acute fractures. \par

## 2022-10-17 ENCOUNTER — APPOINTMENT (OUTPATIENT)
Dept: ORTHOPEDIC SURGERY | Facility: CLINIC | Age: 62
End: 2022-10-17

## 2022-10-17 DIAGNOSIS — L60.0 INGROWING NAIL: ICD-10-CM

## 2022-10-17 PROCEDURE — 99214 OFFICE O/P EST MOD 30 MIN: CPT

## 2022-10-17 PROCEDURE — 73630 X-RAY EXAM OF FOOT: CPT | Mod: 50

## 2022-10-17 NOTE — DISCUSSION/SUMMARY
[de-identified] : Today I had a lengthy discussion with the patient regarding their left foot pain. I have addressed all the patient's concerns surrounding the pathology of their condition. XR imaging results were reviewed with the patient. At this time I would like to obtain advanced imaging of the patient's left foot. An MRI was ordered so I can find out more about the etiology of the patient's condition. The patient should follow up with the office after obtaining the MRI. The patient understood and verbally agreed to the treatment plan. All of their questions were answered and they were satisfied with the visit. The patient should call the office if they have any questions or experience worsening symptoms\par \par

## 2022-10-17 NOTE — HISTORY OF PRESENT ILLNESS
[FreeTextEntry1] : 10/17/2022: The patient is a 62 year old male presenting for a follow-up evaluation of left foot pain. He is concerned with increased pain localized over the fifth toe. History of chronic dislocation to the toe. His small toe is crossing over the fourth toe. He is wearing sneakers and is walking without assistance. No other complaints. \par \par 6/01/2022: The patient is a 62 year old male presenting for an initial evaluation of chronic bilateral foot pain, left greater than right. The patient is concerned with the appearance of an ingrown toenail to his left foot that is painful to touch. 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 He presents today for further evaluation of the same. He is also concerned with the pain to the fifth metatarsal, reporting history of previous fracture. He reports a possible "calcium deposit" to his lateral foot. He has tried topical creams including Mupirocin and Neosporin for this issue. He denies any numbness or tingling sensations to their feet. No other complaints. \par

## 2022-10-17 NOTE — ADDENDUM
[FreeTextEntry1] : I, Argentina Ortiz, acted solely as a scribe for Dr. Duarte Preston on this date 10/17/2022.\par \par All medical record entries made by the Scribe were at my, Dr. Duarte Preston, direction and personally dictated by me on 10/17/2022. I have reviewed the chart and agree that the record accurately reflects my personal performance of the history, physical exam, assessment and plan. I have also personally directed, reviewed, and agreed with the chart.	\par

## 2022-10-17 NOTE — PHYSICAL EXAM
[de-identified] : General: Alert and oriented x3. In no acute distress. Pleasant in nature with a normal affect. No apparent respiratory distress.\par \par Left Foot Exam\par Skin: Clean, dry, intact\par Inspection: Chronically dislocated fifth metatarsal. Medial subluxation crossover toe left small toe over fourth. No obvious malalignment, no masses, no swelling, no effusion\par Pulses: 2+ DP/PT pulses\par ROM: FOOT Full  ROM of digits, ANKLE 10 degrees of dorsiflexion, 40 degrees of plantarflexion, 10 degrees of subtalar motion.\par Painful ROM: None\par Tenderness: Plantar lateral 5th metatarsal head pain. No tenderness over the medial malleolus, No tenderness over the lateral malleolus, no CFL/ATFL/PTFL pain, no deltoid ligament pain. No heel pain. No Achilles tenderness. No pain to the LisFranc joint. No ttp over the posterior tibial tendon.\par Stability: Negative anterior/posterior drawer.\par Strength: 5/5 ADD/ABD/TA/GS/EHL/FHL/EDL\par Neuro: Sensation in tact to light touch throughout\par Additional tests: Negative Mortons test, negative tarsal tunnel tinels, negative single heel rise.	 [de-identified] : X-rays of the bilateral feet were ordered and obtained on 6/01/2022 and reviewed by me today, 10/17/2022, revealed: Chronically dislocated fifth metatarsal. No acute fractures. \par

## 2022-10-26 ENCOUNTER — OUTPATIENT (OUTPATIENT)
Dept: OUTPATIENT SERVICES | Facility: HOSPITAL | Age: 62
LOS: 1 days | End: 2022-10-26
Payer: COMMERCIAL

## 2022-10-26 ENCOUNTER — APPOINTMENT (OUTPATIENT)
Dept: MRI IMAGING | Facility: CLINIC | Age: 62
End: 2022-10-26

## 2022-10-26 DIAGNOSIS — Z98.1 ARTHRODESIS STATUS: Chronic | ICD-10-CM

## 2022-10-26 DIAGNOSIS — M77.42 METATARSALGIA, LEFT FOOT: ICD-10-CM

## 2022-10-26 DIAGNOSIS — Z98.890 OTHER SPECIFIED POSTPROCEDURAL STATES: Chronic | ICD-10-CM

## 2022-10-26 DIAGNOSIS — Z90.89 ACQUIRED ABSENCE OF OTHER ORGANS: Chronic | ICD-10-CM

## 2022-10-26 DIAGNOSIS — M20.5X2 OTHER DEFORMITIES OF TOE(S) (ACQUIRED), LEFT FOOT: ICD-10-CM

## 2022-10-26 PROCEDURE — 73718 MRI LOWER EXTREMITY W/O DYE: CPT

## 2022-10-26 PROCEDURE — 73718 MRI LOWER EXTREMITY W/O DYE: CPT | Mod: 26,LT

## 2022-11-09 ENCOUNTER — APPOINTMENT (OUTPATIENT)
Dept: ORTHOPEDIC SURGERY | Facility: CLINIC | Age: 62
End: 2022-11-09

## 2022-11-09 DIAGNOSIS — M20.5X2 OTHER DEFORMITIES OF TOE(S) (ACQUIRED), LEFT FOOT: ICD-10-CM

## 2022-11-09 DIAGNOSIS — M77.42 METATARSALGIA, LEFT FOOT: ICD-10-CM

## 2022-11-09 PROCEDURE — 99214 OFFICE O/P EST MOD 30 MIN: CPT
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
## 2022-11-09 NOTE — ADDENDUM
[FreeTextEntry1] : I, Argentina Ortiz, acted solely as a scribe for Dr. Duarte Preston on this date 11/09/2022.\par \par All medical record entries made by the Scribe were at my, Dr. Duarte Preston, direction and personally dictated by me on 11/09/2022. I have reviewed the chart and agree that the record accurately reflects my personal performance of the history, physical exam, assessment and plan. I have also personally directed, reviewed, and agreed with the chart.	\par

## 2022-11-09 NOTE — PHYSICAL EXAM
[de-identified] : General: Alert and oriented x3. In no acute distress. Pleasant in nature with a normal affect. No apparent respiratory distress.\par \par Left Foot Exam\par Skin: Clean, dry, intact\par Inspection: Chronically dislocated fifth metatarsal. Medial subluxation crossover toe left small toe over fourth. No obvious malalignment, no masses, no swelling, no effusion\par Pulses: 2+ DP/PT pulses\par ROM: FOOT Full  ROM of digits, ANKLE 10 degrees of dorsiflexion, 40 degrees of plantarflexion, 10 degrees of subtalar motion.\par Painful ROM: None\par Tenderness: Plantar lateral 5th metatarsal head pain. No tenderness over the medial malleolus, No tenderness over the lateral malleolus, no CFL/ATFL/PTFL pain, no deltoid ligament pain. No heel pain. No Achilles tenderness. No pain to the LisFranc joint. No ttp over the posterior tibial tendon.\par Stability: Negative anterior/posterior drawer.\par Strength: 5/5 ADD/ABD/TA/GS/EHL/FHL/EDL\par Neuro: Sensation in tact to light touch throughout\par Additional tests: Negative Mortons test, negative tarsal tunnel tinels, negative single heel rise.	 [de-identified] : My interpretation and review of images in office:\par \par EXAM: [id number] - MR FOOT LT  - ORDERED BY:  DEEDEE HOWELL\par \par PROCEDURE DATE:  10/26/2022\par \par \par \par INTERPRETATION:   History: Crossover toe, preoperative planning\par \par Technique: Multiplanar and multisequence MRI images were obtained through the left forefoot without contrast.\par \par Comparison: None available\par \par Findings:\par \par No acute fracture. Mild first metatarsophalangeal hallux sesamoid complex chondral wear and mild first tarsometatarsal joint chondral wear. Partially evaluated chondral wear at the navicular-medial cuneiform interval. There is abnormal morphology fifth metatarsal head with crossover toe of the fifth toe.\par \par Lisfranc ligament is intact. Musculature is unremarkable. Visualized tendons are unremarkable.\par \par There is chronic partial tearing of the fifth plantar plate with resultant crossover toe of the fifth toe. Remaining plantar plates of the lesser toes appear intact.\par \par Small second webspace interdigital neuroma, measuring 4 x 3 mm. Small third webspace interdigital neuroma, measuring 6 x 3 mm. Mild first webspace intermetatarsal bursitis.\par \par Impression:\par \par Chronic partial tearing of the fifth plantar plate with resultant crossover toe of the fifth toe. Abnormal morphology of the fifth metatarsal head, which may be posttraumatic.\par \par Small second and third webspace interdigital neuromas.\par \par Mild arthrosis at the first metatarsophalangeal joint, first tarsometatarsal joint, and navicular-medial cuneiform interval.\par \par --- End of Report ---\par \par \par TANGELA ROJO M.D., ATTENDING RADIOLOGIST\par This document has been electronically signed. Oct 28 2022  4:22PM

## 2022-11-09 NOTE — DISCUSSION/SUMMARY
[de-identified] : Today I had a lengthy discussion with the patient regarding their left foot pain. I have addressed all the patient's concerns surrounding the pathology of their condition. MRI results were reviewed with the patient today in the office. Further, we discussed both operative and nonoperative treatment options in the office. A discussion was had about utilizing custom orthotics. The patient was educated about custom orthotics in the office today. The patient understood and verbally agreed to the treatment plan. All of their questions were answered and they were satisfied with the visit. The patient should call the office if they have any questions or experience worsening symptoms. I would like to see the patient back in the office in PRN to reassess their condition. 				\par

## 2022-12-19 ENCOUNTER — NON-APPOINTMENT (OUTPATIENT)
Age: 62
End: 2022-12-19

## 2022-12-19 DIAGNOSIS — T84.068A WEAR OF ARTICULAR BEARING SURFACE OF OTHER INTERNAL PROSTHETIC JOINT, INITIAL ENCOUNTER: ICD-10-CM

## 2022-12-19 DIAGNOSIS — Z96.612 PRESENCE OF LEFT ARTIFICIAL SHOULDER JOINT: ICD-10-CM

## 2022-12-19 DIAGNOSIS — T84.038A MECHANICAL LOOSENING OF OTHER INTERNAL PROSTHETIC JOINT, INITIAL ENCOUNTER: ICD-10-CM

## 2022-12-19 DIAGNOSIS — M25.512 PAIN IN LEFT SHOULDER: ICD-10-CM

## 2023-11-06 ENCOUNTER — APPOINTMENT (OUTPATIENT)
Dept: ORTHOPEDIC SURGERY | Facility: CLINIC | Age: 63
End: 2023-11-06
Payer: OTHER MISCELLANEOUS

## 2023-11-06 VITALS — WEIGHT: 190 LBS | BODY MASS INDEX: 26.6 KG/M2 | HEIGHT: 71 IN

## 2023-11-06 DIAGNOSIS — M25.462 EFFUSION, LEFT KNEE: ICD-10-CM

## 2023-11-06 DIAGNOSIS — M25.562 PAIN IN LEFT KNEE: ICD-10-CM

## 2023-11-06 PROCEDURE — 73564 X-RAY EXAM KNEE 4 OR MORE: CPT | Mod: LT

## 2023-11-06 PROCEDURE — 99205 OFFICE O/P NEW HI 60 MIN: CPT

## 2023-11-07 PROBLEM — M25.462 EFFUSION OF LEFT KNEE: Status: ACTIVE | Noted: 2023-11-07

## 2023-12-04 ENCOUNTER — APPOINTMENT (OUTPATIENT)
Dept: MRI IMAGING | Facility: CLINIC | Age: 63
End: 2023-12-04
Payer: OTHER MISCELLANEOUS

## 2023-12-04 PROCEDURE — 73721 MRI JNT OF LWR EXTRE W/O DYE: CPT | Mod: LT

## 2023-12-06 ENCOUNTER — NON-APPOINTMENT (OUTPATIENT)
Age: 63
End: 2023-12-06

## 2024-01-05 ENCOUNTER — OUTPATIENT (OUTPATIENT)
Dept: OUTPATIENT SERVICES | Facility: HOSPITAL | Age: 64
LOS: 1 days | End: 2024-01-05
Payer: COMMERCIAL

## 2024-01-05 VITALS
HEART RATE: 50 BPM | DIASTOLIC BLOOD PRESSURE: 82 MMHG | SYSTOLIC BLOOD PRESSURE: 127 MMHG | TEMPERATURE: 98 F | WEIGHT: 196.65 LBS | RESPIRATION RATE: 16 BRPM | HEIGHT: 71 IN | OXYGEN SATURATION: 98 %

## 2024-01-05 DIAGNOSIS — Z98.890 OTHER SPECIFIED POSTPROCEDURAL STATES: Chronic | ICD-10-CM

## 2024-01-05 DIAGNOSIS — Z01.818 ENCOUNTER FOR OTHER PREPROCEDURAL EXAMINATION: ICD-10-CM
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
## 2024-01-05 DIAGNOSIS — Z98.1 ARTHRODESIS STATUS: Chronic | ICD-10-CM
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
## 2024-01-05 DIAGNOSIS — Z90.89 ACQUIRED ABSENCE OF OTHER ORGANS: Chronic | ICD-10-CM

## 2024-01-05 DIAGNOSIS — S83.232A COMPLEX TEAR OF MEDIAL MENISCUS, CURRENT INJURY, LEFT KNEE, INITIAL ENCOUNTER: ICD-10-CM

## 2024-01-05 DIAGNOSIS — Z96.612 PRESENCE OF LEFT ARTIFICIAL SHOULDER JOINT: Chronic | ICD-10-CM

## 2024-01-05 LAB
ANION GAP SERPL CALC-SCNC: 6 MMOL/L — SIGNIFICANT CHANGE UP (ref 5–17)
ANION GAP SERPL CALC-SCNC: 6 MMOL/L — SIGNIFICANT CHANGE UP (ref 5–17)
BASOPHILS # BLD AUTO: 0.05 K/UL — SIGNIFICANT CHANGE UP (ref 0–0.2)
BASOPHILS # BLD AUTO: 0.05 K/UL — SIGNIFICANT CHANGE UP (ref 0–0.2)
BASOPHILS NFR BLD AUTO: 0.8 % — SIGNIFICANT CHANGE UP (ref 0–2)
BASOPHILS NFR BLD AUTO: 0.8 % — SIGNIFICANT CHANGE UP (ref 0–2)
BUN SERPL-MCNC: 8 MG/DL — SIGNIFICANT CHANGE UP (ref 7–23)
BUN SERPL-MCNC: 8 MG/DL — SIGNIFICANT CHANGE UP (ref 7–23)
CALCIUM SERPL-MCNC: 8.6 MG/DL — SIGNIFICANT CHANGE UP (ref 8.5–10.1)
CALCIUM SERPL-MCNC: 8.6 MG/DL — SIGNIFICANT CHANGE UP (ref 8.5–10.1)
CHLORIDE SERPL-SCNC: 103 MMOL/L — SIGNIFICANT CHANGE UP (ref 96–108)
CHLORIDE SERPL-SCNC: 103 MMOL/L — SIGNIFICANT CHANGE UP (ref 96–108)
CO2 SERPL-SCNC: 26 MMOL/L — SIGNIFICANT CHANGE UP (ref 22–31)
CO2 SERPL-SCNC: 26 MMOL/L — SIGNIFICANT CHANGE UP (ref 22–31)
CREAT SERPL-MCNC: 0.65 MG/DL — SIGNIFICANT CHANGE UP (ref 0.5–1.3)
CREAT SERPL-MCNC: 0.65 MG/DL — SIGNIFICANT CHANGE UP (ref 0.5–1.3)
EGFR: 107 ML/MIN/1.73M2 — SIGNIFICANT CHANGE UP
EGFR: 107 ML/MIN/1.73M2 — SIGNIFICANT CHANGE UP
EOSINOPHIL # BLD AUTO: 0.22 K/UL — SIGNIFICANT CHANGE UP (ref 0–0.5)
EOSINOPHIL # BLD AUTO: 0.22 K/UL — SIGNIFICANT CHANGE UP (ref 0–0.5)
EOSINOPHIL NFR BLD AUTO: 3.5 % — SIGNIFICANT CHANGE UP (ref 0–6)
EOSINOPHIL NFR BLD AUTO: 3.5 % — SIGNIFICANT CHANGE UP (ref 0–6)
GLUCOSE SERPL-MCNC: 109 MG/DL — HIGH (ref 70–99)
GLUCOSE SERPL-MCNC: 109 MG/DL — HIGH (ref 70–99)
HCT VFR BLD CALC: 39.9 % — SIGNIFICANT CHANGE UP (ref 39–50)
HCT VFR BLD CALC: 39.9 % — SIGNIFICANT CHANGE UP (ref 39–50)
HGB BLD-MCNC: 13.4 G/DL — SIGNIFICANT CHANGE UP (ref 13–17)
HGB BLD-MCNC: 13.4 G/DL — SIGNIFICANT CHANGE UP (ref 13–17)
IMM GRANULOCYTES NFR BLD AUTO: 0.2 % — SIGNIFICANT CHANGE UP (ref 0–0.9)
IMM GRANULOCYTES NFR BLD AUTO: 0.2 % — SIGNIFICANT CHANGE UP (ref 0–0.9)
LYMPHOCYTES # BLD AUTO: 1.37 K/UL — SIGNIFICANT CHANGE UP (ref 1–3.3)
LYMPHOCYTES # BLD AUTO: 1.37 K/UL — SIGNIFICANT CHANGE UP (ref 1–3.3)
LYMPHOCYTES # BLD AUTO: 21.7 % — SIGNIFICANT CHANGE UP (ref 13–44)
LYMPHOCYTES # BLD AUTO: 21.7 % — SIGNIFICANT CHANGE UP (ref 13–44)
MCHC RBC-ENTMCNC: 30.4 PG — SIGNIFICANT CHANGE UP (ref 27–34)
MCHC RBC-ENTMCNC: 30.4 PG — SIGNIFICANT CHANGE UP (ref 27–34)
MCHC RBC-ENTMCNC: 33.6 GM/DL — SIGNIFICANT CHANGE UP (ref 32–36)
MCHC RBC-ENTMCNC: 33.6 GM/DL — SIGNIFICANT CHANGE UP (ref 32–36)
MCV RBC AUTO: 90.5 FL — SIGNIFICANT CHANGE UP (ref 80–100)
MCV RBC AUTO: 90.5 FL — SIGNIFICANT CHANGE UP (ref 80–100)
MONOCYTES # BLD AUTO: 0.52 K/UL — SIGNIFICANT CHANGE UP (ref 0–0.9)
MONOCYTES # BLD AUTO: 0.52 K/UL — SIGNIFICANT CHANGE UP (ref 0–0.9)
MONOCYTES NFR BLD AUTO: 8.3 % — SIGNIFICANT CHANGE UP (ref 2–14)
MONOCYTES NFR BLD AUTO: 8.3 % — SIGNIFICANT CHANGE UP (ref 2–14)
NEUTROPHILS # BLD AUTO: 4.13 K/UL — SIGNIFICANT CHANGE UP (ref 1.8–7.4)
NEUTROPHILS # BLD AUTO: 4.13 K/UL — SIGNIFICANT CHANGE UP (ref 1.8–7.4)
NEUTROPHILS NFR BLD AUTO: 65.5 % — SIGNIFICANT CHANGE UP (ref 43–77)
NEUTROPHILS NFR BLD AUTO: 65.5 % — SIGNIFICANT CHANGE UP (ref 43–77)
PLATELET # BLD AUTO: 242 K/UL — SIGNIFICANT CHANGE UP (ref 150–400)
PLATELET # BLD AUTO: 242 K/UL — SIGNIFICANT CHANGE UP (ref 150–400)
POTASSIUM SERPL-MCNC: 4.3 MMOL/L — SIGNIFICANT CHANGE UP (ref 3.5–5.3)
POTASSIUM SERPL-MCNC: 4.3 MMOL/L — SIGNIFICANT CHANGE UP (ref 3.5–5.3)
POTASSIUM SERPL-SCNC: 4.3 MMOL/L — SIGNIFICANT CHANGE UP (ref 3.5–5.3)
POTASSIUM SERPL-SCNC: 4.3 MMOL/L — SIGNIFICANT CHANGE UP (ref 3.5–5.3)
RBC # BLD: 4.41 M/UL — SIGNIFICANT CHANGE UP (ref 4.2–5.8)
RBC # BLD: 4.41 M/UL — SIGNIFICANT CHANGE UP (ref 4.2–5.8)
RBC # FLD: 12.8 % — SIGNIFICANT CHANGE UP (ref 10.3–14.5)
RBC # FLD: 12.8 % — SIGNIFICANT CHANGE UP (ref 10.3–14.5)
SODIUM SERPL-SCNC: 135 MMOL/L — SIGNIFICANT CHANGE UP (ref 135–145)
SODIUM SERPL-SCNC: 135 MMOL/L — SIGNIFICANT CHANGE UP (ref 135–145)
WBC # BLD: 6.3 K/UL — SIGNIFICANT CHANGE UP (ref 3.8–10.5)
WBC # BLD: 6.3 K/UL — SIGNIFICANT CHANGE UP (ref 3.8–10.5)
WBC # FLD AUTO: 6.3 K/UL — SIGNIFICANT CHANGE UP (ref 3.8–10.5)
WBC # FLD AUTO: 6.3 K/UL — SIGNIFICANT CHANGE UP (ref 3.8–10.5)

## 2024-01-05 PROCEDURE — 36415 COLL VENOUS BLD VENIPUNCTURE: CPT

## 2024-01-05 PROCEDURE — 85025 COMPLETE CBC W/AUTO DIFF WBC: CPT

## 2024-01-05 PROCEDURE — 93005 ELECTROCARDIOGRAM TRACING: CPT

## 2024-01-05 PROCEDURE — 99214 OFFICE O/P EST MOD 30 MIN: CPT | Mod: 25

## 2024-01-05 PROCEDURE — 80048 BASIC METABOLIC PNL TOTAL CA: CPT

## 2024-01-05 PROCEDURE — 93010 ELECTROCARDIOGRAM REPORT: CPT

## 2024-01-05 RX ORDER — PREGABALIN 225 MG/1
0 CAPSULE ORAL
Refills: 0 | DISCHARGE

## 2024-01-05 RX ORDER — ZINC SULFATE TAB 220 MG (50 MG ZINC EQUIVALENT) 220 (50 ZN) MG
0 TAB ORAL
Refills: 0 | DISCHARGE

## 2024-01-05 RX ORDER — LANOLIN ALCOHOL/MO/W.PET/CERES
0 CREAM (GRAM) TOPICAL
Refills: 0 | DISCHARGE

## 2024-01-05 RX ORDER — CHOLECALCIFEROL (VITAMIN D3) 125 MCG
0 CAPSULE ORAL
Refills: 0 | DISCHARGE

## 2024-01-05 RX ORDER — MILK THISTLE 150 MG
0 CAPSULE ORAL
Refills: 0 | DISCHARGE

## 2024-01-05 NOTE — H&P PST ADULT - NSICDXPASTMEDICALHX_GEN_ALL_CORE_FT
PAST MEDICAL HISTORY:  Arthritis     Herniated cervical disc     Lumbar herniated disc cervical herniated disc    Tear of medial meniscus of left knee     Thoracic disc herniation

## 2024-01-05 NOTE — H&P PST ADULT - CARDIOVASCULAR
regular rate and rhythm/S1 S2 present/no murmur/vascular details… regular rate and rhythm/S1 S2 present/no murmur/peripheral edema/vascular

## 2024-01-05 NOTE — H&P PST ADULT - HISTORY OF PRESENT ILLNESS
61 year old male with OA left s/p left TSR 2006 and revision 2009 ; Pt c/o loose hardware on left shoulder as well as pain and limited ROM; he presents to PST for planned Revision of Left total shoulder replacement  63 year old male with torn medial  meniscus left knee; c/o left knee pain with ROM; Pain is 3-6/10 aching; Pt said he has to be careful going down stairs due to feeling of instability; Moreover knees have a clicking sound with movement; he presents to PST for planned left knee arthroscopy

## 2024-01-05 NOTE — H&P PST ADULT - PAIN SITE
Last time medication was refilled 9/21/21  Quantity and number of refills 180 w/ 0  Last OV 8/25/21  Next OV 12/21 left knee

## 2024-01-05 NOTE — H&P PST ADULT - ASSESSMENT
Plan:  1. PST instructions given ; NPO status/  instructions to be given by ASU   2. Pt instructed to take following meds on day of surgery:   3. Pt instructed to take routine evening medications unless indicated   4. Stop NSAIDS ( Aspirin Alev Motrin Mobic Diclofenac), herbal supplements , MVI , Vitamin fish oil 7 days prior to surgery  unless   directed by surgeon or cardiologist;   5. Medical Optimization  with    6. EZ wash instructions given & mupirocin instructions given  7. Labs EKG CXR as per surgeon request   8. Pt denies covid symptoms shortness of breath fever cough      63 year old male with torn medial  meniscus left knee; c/o left knee pain with ROM; Pain is 3-6/10 aching; Pt said he has to be careful going down stairs due to feeling of instability; Moreover knees have a clicking sound with movement; he presents to PST for planned left knee arthroscopy   Patient right leg swelling negative Sebastián's sign no calf tenderness; pt going to see PMD today; Note to be given to PMD by patient regarding leg edema.      Plan:  1. PST instructions given ; NPO status/  instructions to be given by ASU   2. Pt instructed to take following meds on day of surgery: none  3. Pt instructed to take routine evening medications unless indicated   4. Stop NSAIDS ( Aspirin Alev Motrin Mobic Diclofenac), herbal supplements , MVI , Vitamin fish oil 7 days prior to surgery  unless   directed by surgeon or cardiologist;   5. Medical Optimization  with Dr Barrera  6. EZ wash instructions given   7. Labs EKG  as per surgeon request   8. Pt denies covid symptoms shortness of breath fever cough

## 2024-01-05 NOTE — H&P PST ADULT - NSICDXPASTSURGICALHX_GEN_ALL_CORE_FT
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

## 2024-01-06 DIAGNOSIS — Z01.818 ENCOUNTER FOR OTHER PREPROCEDURAL EXAMINATION: ICD-10-CM

## 2024-01-06 DIAGNOSIS — S83.232A COMPLEX TEAR OF MEDIAL MENISCUS, CURRENT INJURY, LEFT KNEE, INITIAL ENCOUNTER: ICD-10-CM

## 2024-01-13 ENCOUNTER — TRANSCRIPTION ENCOUNTER (OUTPATIENT)
Age: 64
End: 2024-01-13

## 2024-01-25 ENCOUNTER — OUTPATIENT (OUTPATIENT)
Dept: INPATIENT UNIT | Facility: HOSPITAL | Age: 64
LOS: 1 days | Discharge: HOME | End: 2024-01-25
Payer: COMMERCIAL

## 2024-01-25 ENCOUNTER — APPOINTMENT (OUTPATIENT)
Dept: ORTHOPEDIC SURGERY | Facility: HOSPITAL | Age: 64
End: 2024-01-25

## 2024-01-25 ENCOUNTER — RESULT REVIEW (OUTPATIENT)
Age: 64
End: 2024-01-25

## 2024-01-25 ENCOUNTER — TRANSCRIPTION ENCOUNTER (OUTPATIENT)
Age: 64
End: 2024-01-25

## 2024-01-25 VITALS
DIASTOLIC BLOOD PRESSURE: 68 MMHG | SYSTOLIC BLOOD PRESSURE: 112 MMHG | RESPIRATION RATE: 16 BRPM | TEMPERATURE: 98 F | OXYGEN SATURATION: 100 % | HEART RATE: 49 BPM

## 2024-01-25 VITALS
OXYGEN SATURATION: 98 % | SYSTOLIC BLOOD PRESSURE: 130 MMHG | HEIGHT: 71 IN | WEIGHT: 190.04 LBS | TEMPERATURE: 98 F | DIASTOLIC BLOOD PRESSURE: 91 MMHG | RESPIRATION RATE: 16 BRPM | HEART RATE: 51 BPM

## 2024-01-25 DIAGNOSIS — Z98.1 ARTHRODESIS STATUS: Chronic | ICD-10-CM

## 2024-01-25 DIAGNOSIS — Z96.612 PRESENCE OF LEFT ARTIFICIAL SHOULDER JOINT: Chronic | ICD-10-CM

## 2024-01-25 DIAGNOSIS — Z98.890 OTHER SPECIFIED POSTPROCEDURAL STATES: Chronic | ICD-10-CM

## 2024-01-25 DIAGNOSIS — S83.232A COMPLEX TEAR OF MEDIAL MENISCUS, CURRENT INJURY, LEFT KNEE, INITIAL ENCOUNTER: ICD-10-CM

## 2024-01-25 DIAGNOSIS — Z90.89 ACQUIRED ABSENCE OF OTHER ORGANS: Chronic | ICD-10-CM

## 2024-01-25 PROCEDURE — 88304 TISSUE EXAM BY PATHOLOGIST: CPT | Mod: 26

## 2024-01-25 PROCEDURE — 29881 ARTHRS KNE SRG MNISECTMY M/L: CPT | Mod: LT

## 2024-01-25 PROCEDURE — 88304 TISSUE EXAM BY PATHOLOGIST: CPT

## 2024-01-25 RX ORDER — OXYCODONE HYDROCHLORIDE 5 MG/1
1 TABLET ORAL
Qty: 12 | Refills: 0
Start: 2024-01-25 | End: 2024-01-27

## 2024-01-25 RX ORDER — OXYCODONE HYDROCHLORIDE 5 MG/1
5 TABLET ORAL ONCE
Refills: 0 | Status: DISCONTINUED | OUTPATIENT
Start: 2024-01-25 | End: 2024-01-25

## 2024-01-25 RX ORDER — FENTANYL CITRATE 50 UG/ML
50 INJECTION INTRAVENOUS
Refills: 0 | Status: DISCONTINUED | OUTPATIENT
Start: 2024-01-25 | End: 2024-01-25

## 2024-01-25 RX ORDER — ONDANSETRON 8 MG/1
4 TABLET, FILM COATED ORAL ONCE
Refills: 0 | Status: DISCONTINUED | OUTPATIENT
Start: 2024-01-25 | End: 2024-01-25

## 2024-01-25 RX ORDER — SODIUM CHLORIDE 9 MG/ML
1000 INJECTION, SOLUTION INTRAVENOUS
Refills: 0 | Status: DISCONTINUED | OUTPATIENT
Start: 2024-01-25 | End: 2024-01-25

## 2024-01-25 RX ORDER — ASPIRIN/CALCIUM CARB/MAGNESIUM 324 MG
1 TABLET ORAL
Qty: 14 | Refills: 0
Start: 2024-01-25 | End: 2024-02-07

## 2024-01-25 RX ADMIN — OXYCODONE HYDROCHLORIDE 5 MILLIGRAM(S): 5 TABLET ORAL at 16:26

## 2024-01-25 RX ADMIN — FENTANYL CITRATE 50 MICROGRAM(S): 50 INJECTION INTRAVENOUS at 16:47

## 2024-01-25 RX ADMIN — FENTANYL CITRATE 50 MICROGRAM(S): 50 INJECTION INTRAVENOUS at 16:45

## 2024-01-25 RX ADMIN — OXYCODONE HYDROCHLORIDE 5 MILLIGRAM(S): 5 TABLET ORAL at 16:45

## 2024-01-25 RX ADMIN — FENTANYL CITRATE 50 MICROGRAM(S): 50 INJECTION INTRAVENOUS at 16:24

## 2024-01-25 RX ADMIN — FENTANYL CITRATE 50 MICROGRAM(S): 50 INJECTION INTRAVENOUS at 16:36

## 2024-01-25 RX ADMIN — FENTANYL CITRATE 50 MICROGRAM(S): 50 INJECTION INTRAVENOUS at 17:09

## 2024-01-25 RX ADMIN — OXYCODONE HYDROCHLORIDE 5 MILLIGRAM(S): 5 TABLET ORAL at 17:09

## 2024-01-25 RX ADMIN — FENTANYL CITRATE 50 MICROGRAM(S): 50 INJECTION INTRAVENOUS at 16:35

## 2024-01-25 NOTE — ASU PATIENT PROFILE, ADULT - FALL HARM RISK - UNIVERSAL INTERVENTIONS
Bed in lowest position, wheels locked, appropriate side rails in place/Call bell, personal items and telephone in reach/Instruct patient to call for assistance before getting out of bed or chair/Non-slip footwear when patient is out of bed/[place] to call system/Physically safe environment - no spills, clutter or unnecessary equipment/Purposeful Proactive Rounding/Room/bathroom lighting operational, light cord in reach

## 2024-01-25 NOTE — ASU DISCHARGE PLAN (ADULT/PEDIATRIC) - ASU DC SPECIAL INSTRUCTIONSFT
Knee Arthroscopy Instructions    1) Your knee will swell over the next 48hours. Ice your Knee plenty, continuously if possible. Fill up a plastic bag, then wrap it in a towel or pillow case.     2) Elevate your leg above your heart with about 3 pillows when you can, when you are in bed or chair. Otherwise you should be up and about, walking as much as you can tolerate. The more you move the better you will do. Weight bearing as tolerated.    3) BANDAGE: Expect some mild bloody drainage. This is mostly leftover Saline fluid coming out of your knee from surgery. Just place another Gauze and another ACE over it and wrap it snug but not overly tight. If it bleeds through the second bandage again, call.    4) SHOWER: Remove bandage in 48hrs and place Waterproof Band Aides. You can shower in 48 hours. No bath. Pat your incisions dry. No creams, no lotions.    5) If pain gets so severe that you cannot feel or wiggle your toes, or if you have high fever you need to call or come to the ER. But as long as you can feel and wiggle your toes, you are usually fine.    6) Call the office to schedule a follow up appointment to see Dr. JARQUIN in 10-14 days. Your Sutures will be removed at that point.    7) A pain Rx is in the chart; fill it on your way home. OR was sent electronically to your pharmacy, pick it up on the way home.    8) Take a full Aspirn EC 325mg daily for 2 weeks. This is to prevent blood clots.

## 2024-01-31 DIAGNOSIS — E78.5 HYPERLIPIDEMIA, UNSPECIFIED: ICD-10-CM

## 2024-01-31 DIAGNOSIS — M23.262 DERANGEMENT OF OTHER LATERAL MENISCUS DUE TO OLD TEAR OR INJURY, LEFT KNEE: ICD-10-CM

## 2024-01-31 DIAGNOSIS — M23.204 DERANGEMENT OF UNSPECIFIED MEDIAL MENISCUS DUE TO OLD TEAR OR INJURY, LEFT KNEE: ICD-10-CM

## 2024-01-31 DIAGNOSIS — M22.42 CHONDROMALACIA PATELLAE, LEFT KNEE: ICD-10-CM

## 2024-01-31 DIAGNOSIS — M67.52 PLICA SYNDROME, LEFT KNEE: ICD-10-CM

## 2024-02-01 LAB — SURGICAL PATHOLOGY STUDY: SIGNIFICANT CHANGE UP

## 2024-02-07 ENCOUNTER — APPOINTMENT (OUTPATIENT)
Dept: ORTHOPEDIC SURGERY | Facility: CLINIC | Age: 64
End: 2024-02-07
Payer: OTHER MISCELLANEOUS

## 2024-02-07 VITALS — WEIGHT: 185 LBS | BODY MASS INDEX: 25.9 KG/M2 | HEIGHT: 71 IN

## 2024-02-07 PROCEDURE — 99024 POSTOP FOLLOW-UP VISIT: CPT

## 2024-02-08 PROBLEM — M50.20 OTHER CERVICAL DISC DISPLACEMENT, UNSPECIFIED CERVICAL REGION: Chronic | Status: ACTIVE | Noted: 2024-01-05

## 2024-02-08 NOTE — HISTORY OF PRESENT ILLNESS
[Dull/Aching] : dull/aching [5] : 5 [Frequent] : frequent [Rest] : rest [Standing] : standing [Ice] : ice [Stairs] : stairs [Lying in bed] : lying in bed [] : no [FreeTextEntry5] : Pt here for PO#1 of the left knee. States recovery is going well, still sore and has swelling. States some pain.  [FreeTextEntry6] : soreness  [de-identified] : 1/25/24 [de-identified] : meniscus repair
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
## 2024-03-13 ENCOUNTER — APPOINTMENT (OUTPATIENT)
Dept: ORTHOPEDIC SURGERY | Facility: CLINIC | Age: 64
End: 2024-03-13
Payer: OTHER MISCELLANEOUS

## 2024-03-13 DIAGNOSIS — S83.262D PERIPHERAL TEAR OF LATERAL MENISCUS, CURRENT INJURY, LEFT KNEE, SUBSEQUENT ENCOUNTER: ICD-10-CM

## 2024-03-13 PROBLEM — S83.242A OTHER TEAR OF MEDIAL MENISCUS, CURRENT INJURY, LEFT KNEE, INITIAL ENCOUNTER: Chronic | Status: ACTIVE | Noted: 2024-01-05

## 2024-03-13 PROBLEM — M51.24 OTHER INTERVERTEBRAL DISC DISPLACEMENT, THORACIC REGION: Chronic | Status: ACTIVE | Noted: 2024-01-05

## 2024-03-13 PROCEDURE — 99024 POSTOP FOLLOW-UP VISIT: CPT
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
## 2024-03-13 NOTE — HISTORY OF PRESENT ILLNESS
[5] : 5 [Dull/Aching] : dull/aching [Rest] : rest [Frequent] : frequent [Ice] : ice [Standing] : standing [Stairs] : stairs [Lying in bed] : lying in bed [] : no [FreeTextEntry5] : 65 y/o M presents for PO #2 eval of the Lt. knee today. Pt reports improvement since last visit.  [FreeTextEntry6] : soreness  [de-identified] : 1/25/24 [de-identified] : Lt. Knee Arthroscopy

## 2024-03-28 ENCOUNTER — APPOINTMENT (OUTPATIENT)
Dept: ORTHOPEDIC SURGERY | Facility: CLINIC | Age: 64
End: 2024-03-28
Payer: COMMERCIAL

## 2024-03-28 VITALS — WEIGHT: 185 LBS | BODY MASS INDEX: 25.9 KG/M2 | HEIGHT: 71 IN

## 2024-03-28 PROCEDURE — 73130 X-RAY EXAM OF HAND: CPT | Mod: LT

## 2024-03-28 PROCEDURE — 73110 X-RAY EXAM OF WRIST: CPT | Mod: RT

## 2024-03-28 PROCEDURE — 99213 OFFICE O/P EST LOW 20 MIN: CPT

## 2024-03-28 NOTE — IMAGING
[Right] : right wrist [Left] : left hand [de-identified] : Is alert and oriented vital signs stable  Examination left hand reveals a 2 x 2 centimeter soft mobile mass overlying the left long finger MCP joint to the ulnar side.  The mass is soft and mobile but not consistent with a ganglion cyst it is harder in nature than that.  His flexors and extensors are intact.  Is able to make a fist.  Does have some tenderness around the mass.  There is no evidence of open wounds or infection.   Examination right wrist reveals minimal swelling.  Has tenderness around the scaphoid volarly and dorsally as well as the scapholunate ligament and triangular fibrocartilage complex.  Slight restricted range of motion.  The flexors and extensors are intact.  He is neurovascular intact .He has tenderness around the STT joint  He has a normal neurologic exam, normal vascular exam.  There is no evidence for open wounds infection or compartment syndrome. [FreeTextEntry1] : X-rays left hand 3 views revealed no fractures or dislocations. There was some early arthritic changes at the MCP joints of the index and long finger as well as the CMC joint [FreeTextEntry8] : X-rays of right wrist 3 views revealed no fractures or dislocations. There was STT arthritis as well as what appears to be calcification of the TFCC region. There was also osteoarthritis of the second and third MCP joints

## 2024-03-28 NOTE — ASSESSMENT
[FreeTextEntry1] : Left hand mass third MCP joint  Right wrist osteoarthritis  Plan anti-inflammatories with GI precautions, or Tylenol or Voltaren gel or cream.  Also discussed with this time I do recommend MRI of the left hand to further evaluate the mass.  I did advise him based on the results he may be candidate for surgical excision.I did advise him at this point and one of the hand surgeons but I am not operating but does desire surgery should follow-up with one of the operative hand surgeons after the MRI to discuss further.  For the right wrist I did advise he does have osteoarthritis.  Advised him he can use a brace and Voltaren gel or cream.  If the symptoms get worse he would be a candidate for cortisone injection again.  At this point does not feel he needs another injection.  Patient was advised if they develop any severe pain, numbness or tingling or pain with range of motion to the fingers they must call or go to the emergency room immediately. All the signs and symptoms of compartment syndrome were explained.  The patient understands.  This medical record was created utilizing Dragon voice recognition software.  This software is not perfect and may occasionally create typographical errors which may be reflected in the above medical record.

## 2024-03-28 NOTE — HISTORY OF PRESENT ILLNESS
[de-identified] : Patient 64-year-old right-hand-dominant male presents for evaluation of a mass on his left hand. Patient states the mass has been there for a long time.  He says it was attempted to be aspirated by a dermatologist in the past and no fluid came out.  He says the mass is getting larger and it bothers him when he bangs it.  Also complain of a long history of pain and swelling in his right wrist.  He says he was diagnosed in the past by the Dr. Person with arthritis had a cortisone injection in the past.  Does complain of pain on both sides of the wrist and with some loss of range of motion

## 2024-04-09 ENCOUNTER — RESULT REVIEW (OUTPATIENT)
Age: 64
End: 2024-04-09

## 2024-04-11 ENCOUNTER — APPOINTMENT (OUTPATIENT)
Dept: ORTHOPEDIC SURGERY | Facility: CLINIC | Age: 64
End: 2024-04-11
Payer: COMMERCIAL

## 2024-04-11 VITALS — HEIGHT: 71 IN | WEIGHT: 185 LBS | BODY MASS INDEX: 25.9 KG/M2

## 2024-04-11 DIAGNOSIS — M19.031 PRIMARY OSTEOARTHRITIS, RIGHT WRIST: ICD-10-CM

## 2024-04-11 DIAGNOSIS — R22.32 LOCALIZED SWELLING, MASS AND LUMP, LEFT UPPER LIMB: ICD-10-CM

## 2024-04-11 PROCEDURE — 20605 DRAIN/INJ JOINT/BURSA W/O US: CPT | Mod: RT

## 2024-04-11 PROCEDURE — 99213 OFFICE O/P EST LOW 20 MIN: CPT | Mod: 25

## 2024-04-11 NOTE — HISTORY OF PRESENT ILLNESS
[de-identified] : Patient 64-year-old right-hand-dominant male presents for evaluation of a mass on his left hand. Patient states the mass has been there for a long time.  He says it was attempted to be aspirated by a dermatologist in the past and no fluid came out.  He says the mass is getting larger and it bothers him when he bangs it.  Also complain of a long history of pain and swelling in his right wrist.  He says he was diagnosed in the past by the Dr. Person with arthritis had a cortisone injection in the past.  Does complain of pain on both sides of the wrist and with some loss of range of motion  April 11, 2024.  Patient presents for follow-up of the MRI of his left hand.  He was advised of the MRI report official is not available for review.  He says he is only having pain in the mass when he bangs it.  He is also complaining of pain in in the right wrist and desires a cortisone injection.

## 2024-04-11 NOTE — DATA REVIEWED
[FreeTextEntry1] : The MRI was reviewed with the patient.  The report is not available for review but there is a subcutaneous mass overlying the third MCP joint.  The lesion appears benign but the official report is not available for review.

## 2024-04-11 NOTE — ASSESSMENT
[FreeTextEntry1] : Left hand mass third MCP joint  Right wrist osteoarthritis  Plan anti-inflammatories with GI precautions, or Tylenol or Voltaren gel or cream.   The MRI films were reviewed with him but the official report is not available for review.  I did advise him I can call him and the report is available.  I did advise him if the mass is bothering I do recommend a biopsy and excision.  I did advise him I am one of the hand surgeons here but I am not operating at this time and if he desires surgery I will get him at this time over one of the operating hand surgeons Dr. Petty for consultation.  The patient would also like to try cortisone injection for the right wrist.  Under sterile conditions a cortisone injection was administered to the right wrist.  He tolerated procedure well.  He will follow-up if continues to have symptoms.  Patient was advised if they develop any severe pain, numbness or tingling or pain with range of motion to the fingers they must call or go to the emergency room immediately. All the signs and symptoms of compartment syndrome were explained.  The patient understands.  This medical record was created utilizing Dragon voice recognition software.  This software is not perfect and may occasionally create typographical errors which may be reflected in the above medical record.

## 2024-04-11 NOTE — IMAGING
[de-identified] : Is alert and oriented vital signs stable  Examination left hand reveals a 2 x 2 centimeter soft mobile mass overlying the left long finger MCP joint to the ulnar side.  The mass is soft and mobile but not consistent with a ganglion cyst it is harder in nature than that.  His flexors and extensors are intact.  Is able to make a fist.  Does have some tenderness around the mass.  There is no evidence of open wounds or infection.   Examination right wrist reveals minimal swelling.  Has tenderness around the scaphoid volarly and dorsally as well as the scapholunate ligament and triangular fibrocartilage complex.  Slight restricted range of motion.  The flexors and extensors are intact.  He is neurovascular intact .He has tenderness around the STT joint  He has a normal neurologic exam, normal vascular exam.  There is no evidence for open wounds infection or compartment syndrome. [Left] : left hand [Right] : right wrist [FreeTextEntry1] : X-rays left hand 3 views revealed no fractures or dislocations. There was some early arthritic changes at the MCP joints of the index and long finger as well as the CMC joint [FreeTextEntry8] : X-rays of right wrist 3 views revealed no fractures or dislocations. There was STT arthritis as well as what appears to be calcification of the TFCC region. There was also osteoarthritis of the second and third MCP joints

## 2024-04-11 NOTE — PROCEDURE
[Medium Joint Injection] : medium joint injection [Right] : of the right [Wrist] : wrist [Pain] : pain [Inflammation] : inflammation [X-ray evidence of Osteoarthritis on this or prior visit] : x-ray evidence of Osteoarthritis on this or prior visit [Alcohol] : alcohol [Betadine] : betadine [Ethyl Chloride sprayed topically] : ethyl chloride sprayed topically [Sterile technique used] : sterile technique used [___ cc    6mg] :  Betamethasone (Celestone) ~Vcc of 6mg [___ cc    1%] : Lidocaine ~Vcc of 1%  [] : Patient tolerated procedure well [Call if redness, pain or fever occur] : call if redness, pain or fever occur [Apply ice for 15min out of every hour for the next 12-24 hours as tolerated] : apply ice for 15 minutes out of every hour for the next 12-24 hours as tolerated [Risks, benefits, alternatives discussed / Verbal consent obtained] : the risks benefits, and alternatives have been discussed, and verbal consent was obtained [FreeTextEntry3] : Risks, benefits, and alternatives of the cortisone injection were explained to the patient prior to procedure. Risk included to but not limited to increase in sugars if the patient has diabetes and they must check and monitor their sugars, infection, bleeding, vasovagal episode, injury to the nerves, pain at injection site, swelling, loss ROM, hypopigmentation at injection site, fat atrophy, facial flushing and if any facial flushing occurs, they were advised to take Benadryl OTC. Ice to injection site as needed 20 minutes on and off. Patient understands all risks and agrees to the injection with verbal consent. Patient tolerated the procedure well.

## 2024-04-15 ENCOUNTER — APPOINTMENT (OUTPATIENT)
Dept: ORTHOPEDIC SURGERY | Facility: CLINIC | Age: 64
End: 2024-04-15
Payer: OTHER MISCELLANEOUS

## 2024-04-15 VITALS — BODY MASS INDEX: 25.9 KG/M2 | WEIGHT: 185 LBS | HEIGHT: 71 IN

## 2024-04-15 DIAGNOSIS — S83.232A COMPLEX TEAR OF MEDIAL MENISCUS, CURRENT INJURY, LEFT KNEE, INITIAL ENCOUNTER: ICD-10-CM

## 2024-04-15 PROCEDURE — 99024 POSTOP FOLLOW-UP VISIT: CPT

## 2024-04-15 NOTE — ASSESSMENT
[FreeTextEntry1] : The patient is 11 weeks  from his left knee arthroscopy and partial lateral meniscectomy on 1/25/24. The patient has progressed very well and pain has decreased. He is able to complete ADLs as needed. He can complete HEP as needed. The patient is happy with his progress. He feels slight twinges with overuse. He denies buckling or instability. He has not needed pain medications.   Left knee exam: Examination of the left lower extremity reveals normal neurovascular exam.  Examination of the left knee reveals well-healed scars.  There is no signs of infection or DVT.  His range of motion is 0 to 130 degrees with some pain and a slight effusion. There is no instability or apprehension.  The patient is doing well with HEP. He is set to return to full work duty tomorrow 4/16/24. He will return as needed.

## 2024-04-15 NOTE — HISTORY OF PRESENT ILLNESS
[Dull/Aching] : dull/aching [Frequent] : frequent [Rest] : rest [Ice] : ice [Standing] : standing [Stairs] : stairs [Lying in bed] : lying in bed [] : Post Surgical Visit: yes [FreeTextEntry5] : 63 y/o M presents for PO #3 eval of the Lt. knee today. Pt reports improvement since last visit with minimal reported pain. Denies any swelling. Home exercises. [de-identified] : 1/25/24 [de-identified] : Lt. Knee Arthroscopy

## 2024-04-18 ENCOUNTER — NON-APPOINTMENT (OUTPATIENT)
Age: 64
End: 2024-04-18

## 2024-07-11 ENCOUNTER — APPOINTMENT (OUTPATIENT)
Dept: ORTHOPEDIC SURGERY | Facility: CLINIC | Age: 64
End: 2024-07-11

## 2024-07-11 VITALS — HEIGHT: 71 IN | WEIGHT: 185 LBS | BODY MASS INDEX: 25.9 KG/M2

## 2024-07-11 DIAGNOSIS — M19.031 PRIMARY OSTEOARTHRITIS, RIGHT WRIST: ICD-10-CM

## 2024-07-11 PROCEDURE — 20605 DRAIN/INJ JOINT/BURSA W/O US: CPT | Mod: RT

## 2024-07-11 PROCEDURE — 99213 OFFICE O/P EST LOW 20 MIN: CPT | Mod: 25

## 2024-07-11 RX ORDER — MELOXICAM 15 MG/1
15 TABLET ORAL
Qty: 30 | Refills: 1 | Status: ACTIVE | COMMUNITY
Start: 2024-07-11 | End: 1900-01-01

## 2024-09-26 ENCOUNTER — APPOINTMENT (OUTPATIENT)
Dept: ORTHOPEDIC SURGERY | Facility: CLINIC | Age: 64
End: 2024-09-26
Payer: COMMERCIAL

## 2024-09-26 VITALS — WEIGHT: 185 LBS | HEIGHT: 71 IN | BODY MASS INDEX: 25.9 KG/M2

## 2024-09-26 DIAGNOSIS — M19.031 PRIMARY OSTEOARTHRITIS, RIGHT WRIST: ICD-10-CM

## 2024-09-26 PROCEDURE — 99213 OFFICE O/P EST LOW 20 MIN: CPT

## 2024-09-26 RX ORDER — METHYLPREDNISOLONE 4 MG/1
4 TABLET ORAL
Qty: 1 | Refills: 0 | Status: ACTIVE | COMMUNITY
Start: 2024-09-26 | End: 1900-01-01

## 2024-09-26 NOTE — IMAGING
[Left] : left hand [Right] : right wrist [de-identified] : He Is alert and oriented vital signs stable   Examination right wrist reveals minimal swelling.  Has tenderness around the scaphoid volarly and dorsally as well as the scapholunate ligament and triangular fibrocartilage complex.  Slight restricted range of motion.  The flexors and extensors are intact.  He is neurovascular intact .He has tenderness around the STT joint. He does have tenderness around the ulnar side of the wrist around the TFCC volarly and dorsally  He has a normal neurologic exam, normal vascular exam.  There is no evidence for open wounds infection or compartment syndrome. [FreeTextEntry1] : X-rays left hand 3 views revealed no fractures or dislocations. There was some early arthritic changes at the MCP joints of the index and long finger as well as the CMC joint [FreeTextEntry8] : X-rays of right wrist 3 views revealed no fractures or dislocations. There was STT arthritis as well as what appears to be calcification of the TFCC region. There was also osteoarthritis of the second and third MCP joints

## 2024-09-26 NOTE — ASSESSMENT
[FreeTextEntry1] : Right wrist osteoarthritis  Plan Ice 20 minutes on and 20 minutes off and continue the brace.  I did advise him at this point he is having pain in the different area of the wrist and it is inflamed he could try a Medrol Dosepak to try to calm it down which she like to try.  I did advise meantime he take a Medrol Dosepak there is a risk of getting arthritis but he already has arthritis in the wrist.  I did advise him while he is on the Medrol Dosepak not to use the Mobic.  He will also use the brace and follow-up if he continues have symptoms.   All questions were answered is agreeable with the plan.  Patient was advised if they develop any severe pain, numbness or tingling or pain with range of motion to the fingers they must call or go to the emergency room immediately. All the signs and symptoms of compartment syndrome were explained.  The patient understands.  This medical record was created utilizing Dragon voice recognition software.  This software is not perfect and may occasionally create typographical errors which may be reflected in the above medical record.

## 2024-09-26 NOTE — HISTORY OF PRESENT ILLNESS
[de-identified] : Patient 64-year-old right-hand-dominant male presents for evaluation of a mass on his left hand. Patient states the mass has been there for a long time.  He says it was attempted to be aspirated by a dermatologist in the past and no fluid came out.  He says the mass is getting larger and it bothers him when he bangs it.  Also complain of a long history of pain and swelling in his right wrist.  He says he was diagnosed in the past by the Dr. Person with arthritis had a cortisone injection in the past.  Does complain of pain on both sides of the wrist and with some loss of range of motion  April 11, 2024.  Patient presents for follow-up of the MRI of his left hand.  He was advised of the MRI report official is not available for review.  He says he is only having pain in the mass when he bangs it.  He is also complaining of pain in in the right wrist and desires a cortisone injection.  July 11, 2024.  Patient resents for follow-up for his right wrist arthritis.  Does state the cortisone injection did help and he like to try another injection.  He also states he used meloxicam in the past with relief.  September 26, 2024.  Patient presents for follow-up for his right wrist and his right wrist arthritis.  He said over the past few days he was having severe pain on the ulnar side of his wrist that was going up his arm.  90 numbness or tingling.  He is taking the Mobic and using the wrist brace.  He said the pain did improve slightly today but is not being relieved with the Mobic.  He

## 2025-02-14 ENCOUNTER — APPOINTMENT (OUTPATIENT)
Dept: ORTHOPEDIC SURGERY | Facility: CLINIC | Age: 65
End: 2025-02-14
Payer: COMMERCIAL

## 2025-02-14 VITALS — HEIGHT: 71 IN | WEIGHT: 185 LBS | BODY MASS INDEX: 25.9 KG/M2

## 2025-02-14 DIAGNOSIS — R22.32 LOCALIZED SWELLING, MASS AND LUMP, LEFT UPPER LIMB: ICD-10-CM

## 2025-02-14 PROCEDURE — 99214 OFFICE O/P EST MOD 30 MIN: CPT

## 2025-02-14 RX ORDER — MELOXICAM 15 MG/1
15 TABLET ORAL
Qty: 30 | Refills: 0 | Status: ACTIVE | COMMUNITY
Start: 2025-02-14 | End: 1900-01-01

## 2025-03-03 ENCOUNTER — APPOINTMENT (OUTPATIENT)
Facility: CLINIC | Age: 65
End: 2025-03-03
Payer: OTHER MISCELLANEOUS

## 2025-03-03 VITALS — WEIGHT: 185 LBS | HEIGHT: 71 IN | BODY MASS INDEX: 25.9 KG/M2

## 2025-03-03 DIAGNOSIS — M25.462 EFFUSION, LEFT KNEE: ICD-10-CM

## 2025-03-03 PROCEDURE — 99213 OFFICE O/P EST LOW 20 MIN: CPT | Mod: 25

## 2025-03-03 PROCEDURE — J3490M: CUSTOM

## 2025-03-03 PROCEDURE — 73564 X-RAY EXAM KNEE 4 OR MORE: CPT | Mod: LT

## 2025-03-03 PROCEDURE — 20610 DRAIN/INJ JOINT/BURSA W/O US: CPT | Mod: LT

## 2025-03-13 ENCOUNTER — APPOINTMENT (OUTPATIENT)
Dept: ORTHOPEDIC SURGERY | Facility: CLINIC | Age: 65
End: 2025-03-13

## 2025-03-21 ENCOUNTER — RESULT REVIEW (OUTPATIENT)
Age: 65
End: 2025-03-21

## 2025-03-24 ENCOUNTER — NON-APPOINTMENT (OUTPATIENT)
Age: 65
End: 2025-03-24

## 2025-03-26 ENCOUNTER — APPOINTMENT (OUTPATIENT)
Facility: CLINIC | Age: 65
End: 2025-03-26
Payer: OTHER MISCELLANEOUS

## 2025-03-26 VITALS — HEIGHT: 71 IN | BODY MASS INDEX: 26.6 KG/M2 | WEIGHT: 190 LBS

## 2025-03-26 DIAGNOSIS — M25.462 EFFUSION, LEFT KNEE: ICD-10-CM

## 2025-03-26 DIAGNOSIS — S83.262D PERIPHERAL TEAR OF LATERAL MENISCUS, CURRENT INJURY, LEFT KNEE, SUBSEQUENT ENCOUNTER: ICD-10-CM

## 2025-03-26 PROCEDURE — 99214 OFFICE O/P EST MOD 30 MIN: CPT

## 2025-04-22 RX ORDER — HYDROCODONE BITARTRATE AND ACETAMINOPHEN 5; 325 MG/1; MG/1
5-325 TABLET ORAL
Qty: 20 | Refills: 0 | Status: ACTIVE | COMMUNITY
Start: 2025-04-22 | End: 1900-01-01

## 2025-04-23 ENCOUNTER — APPOINTMENT (OUTPATIENT)
Dept: ORTHOPEDIC SURGERY | Facility: AMBULATORY SURGERY CENTER | Age: 65
End: 2025-04-23
Payer: COMMERCIAL

## 2025-04-23 ENCOUNTER — RESULT REVIEW (OUTPATIENT)
Age: 65
End: 2025-04-23

## 2025-04-23 PROCEDURE — 26116 EXC HAND TUM DEEP < 1.5 CM: CPT | Mod: AS,LT

## 2025-04-23 PROCEDURE — 26116 EXC HAND TUM DEEP < 1.5 CM: CPT | Mod: LT

## 2025-05-02 ENCOUNTER — APPOINTMENT (OUTPATIENT)
Dept: ORTHOPEDIC SURGERY | Facility: CLINIC | Age: 65
End: 2025-05-02
Payer: COMMERCIAL

## 2025-05-02 VITALS — HEIGHT: 71 IN | WEIGHT: 190 LBS | BODY MASS INDEX: 26.6 KG/M2

## 2025-05-02 DIAGNOSIS — R22.32 LOCALIZED SWELLING, MASS AND LUMP, LEFT UPPER LIMB: ICD-10-CM

## 2025-05-02 PROCEDURE — 99024 POSTOP FOLLOW-UP VISIT: CPT

## 2025-05-05 ENCOUNTER — OUTPATIENT (OUTPATIENT)
Dept: OUTPATIENT SERVICES | Facility: HOSPITAL | Age: 65
LOS: 1 days | End: 2025-05-05
Payer: COMMERCIAL

## 2025-05-05 VITALS
HEART RATE: 49 BPM | OXYGEN SATURATION: 100 % | DIASTOLIC BLOOD PRESSURE: 81 MMHG | TEMPERATURE: 98 F | SYSTOLIC BLOOD PRESSURE: 126 MMHG | HEIGHT: 70 IN | RESPIRATION RATE: 16 BRPM | WEIGHT: 205.03 LBS

## 2025-05-05 DIAGNOSIS — Z98.1 ARTHRODESIS STATUS: Chronic | ICD-10-CM

## 2025-05-05 DIAGNOSIS — Z01.818 ENCOUNTER FOR OTHER PREPROCEDURAL EXAMINATION: ICD-10-CM

## 2025-05-05 DIAGNOSIS — Z98.890 OTHER SPECIFIED POSTPROCEDURAL STATES: Chronic | ICD-10-CM

## 2025-05-05 DIAGNOSIS — Z90.89 ACQUIRED ABSENCE OF OTHER ORGANS: Chronic | ICD-10-CM

## 2025-05-05 DIAGNOSIS — Z96.612 PRESENCE OF LEFT ARTIFICIAL SHOULDER JOINT: Chronic | ICD-10-CM

## 2025-05-05 LAB
ANION GAP SERPL CALC-SCNC: 5 MMOL/L — SIGNIFICANT CHANGE UP (ref 5–17)
BASOPHILS # BLD AUTO: 0.06 K/UL — SIGNIFICANT CHANGE UP (ref 0–0.2)
BASOPHILS NFR BLD AUTO: 1.1 % — SIGNIFICANT CHANGE UP (ref 0–2)
BUN SERPL-MCNC: 11 MG/DL — SIGNIFICANT CHANGE UP (ref 7–23)
CALCIUM SERPL-MCNC: 9.2 MG/DL — SIGNIFICANT CHANGE UP (ref 8.5–10.1)
CHLORIDE SERPL-SCNC: 99 MMOL/L — SIGNIFICANT CHANGE UP (ref 96–108)
CO2 SERPL-SCNC: 28 MMOL/L — SIGNIFICANT CHANGE UP (ref 22–31)
CREAT SERPL-MCNC: 1.07 MG/DL — SIGNIFICANT CHANGE UP (ref 0.5–1.3)
EGFR: 77 ML/MIN/1.73M2 — SIGNIFICANT CHANGE UP
EGFR: 77 ML/MIN/1.73M2 — SIGNIFICANT CHANGE UP
EOSINOPHIL # BLD AUTO: 0.35 K/UL — SIGNIFICANT CHANGE UP (ref 0–0.5)
EOSINOPHIL NFR BLD AUTO: 6.2 % — HIGH (ref 0–6)
GLUCOSE SERPL-MCNC: 90 MG/DL — SIGNIFICANT CHANGE UP (ref 70–99)
HCT VFR BLD CALC: 39.4 % — SIGNIFICANT CHANGE UP (ref 39–50)
HGB BLD-MCNC: 13.4 G/DL — SIGNIFICANT CHANGE UP (ref 13–17)
IMM GRANULOCYTES # BLD AUTO: 0.02 K/UL — SIGNIFICANT CHANGE UP (ref 0–0.07)
IMM GRANULOCYTES NFR BLD AUTO: 0.4 % — SIGNIFICANT CHANGE UP (ref 0–0.9)
LYMPHOCYTES # BLD AUTO: 1.03 K/UL — SIGNIFICANT CHANGE UP (ref 1–3.3)
LYMPHOCYTES NFR BLD AUTO: 18.1 % — SIGNIFICANT CHANGE UP (ref 13–44)
MCHC RBC-ENTMCNC: 30.6 PG — SIGNIFICANT CHANGE UP (ref 27–34)
MCHC RBC-ENTMCNC: 34 G/DL — SIGNIFICANT CHANGE UP (ref 32–36)
MCV RBC AUTO: 90 FL — SIGNIFICANT CHANGE UP (ref 80–100)
MONOCYTES # BLD AUTO: 0.56 K/UL — SIGNIFICANT CHANGE UP (ref 0–0.9)
MONOCYTES NFR BLD AUTO: 9.8 % — SIGNIFICANT CHANGE UP (ref 2–14)
NEUTROPHILS # BLD AUTO: 3.67 K/UL — SIGNIFICANT CHANGE UP (ref 1.8–7.4)
NEUTROPHILS NFR BLD AUTO: 64.4 % — SIGNIFICANT CHANGE UP (ref 43–77)
NRBC # BLD AUTO: 0 K/UL — SIGNIFICANT CHANGE UP (ref 0–0)
NRBC # FLD: 0 K/UL — SIGNIFICANT CHANGE UP (ref 0–0)
NRBC BLD AUTO-RTO: 0 /100 WBCS — SIGNIFICANT CHANGE UP (ref 0–0)
PLATELET # BLD AUTO: 268 K/UL — SIGNIFICANT CHANGE UP (ref 150–400)
PMV BLD: 9.4 FL — SIGNIFICANT CHANGE UP (ref 7–13)
POTASSIUM SERPL-MCNC: 4.5 MMOL/L — SIGNIFICANT CHANGE UP (ref 3.5–5.3)
POTASSIUM SERPL-SCNC: 4.5 MMOL/L — SIGNIFICANT CHANGE UP (ref 3.5–5.3)
RBC # BLD: 4.38 M/UL — SIGNIFICANT CHANGE UP (ref 4.2–5.8)
RBC # FLD: 12.4 % — SIGNIFICANT CHANGE UP (ref 10.3–14.5)
SODIUM SERPL-SCNC: 132 MMOL/L — LOW (ref 135–145)
WBC # BLD: 5.69 K/UL — SIGNIFICANT CHANGE UP (ref 3.8–10.5)
WBC # FLD AUTO: 5.69 K/UL — SIGNIFICANT CHANGE UP (ref 3.8–10.5)

## 2025-05-05 PROCEDURE — 85025 COMPLETE CBC W/AUTO DIFF WBC: CPT

## 2025-05-05 PROCEDURE — 99214 OFFICE O/P EST MOD 30 MIN: CPT | Mod: 25

## 2025-05-05 PROCEDURE — 93010 ELECTROCARDIOGRAM REPORT: CPT

## 2025-05-05 PROCEDURE — 36415 COLL VENOUS BLD VENIPUNCTURE: CPT

## 2025-05-05 PROCEDURE — 93005 ELECTROCARDIOGRAM TRACING: CPT

## 2025-05-05 PROCEDURE — 80048 BASIC METABOLIC PNL TOTAL CA: CPT

## 2025-05-05 NOTE — H&P PST ADULT - ASSESSMENT
65 year old male PMH significant for spinal disc disease s/p cervical, lumbar and thoracic fusion; Pre- op diagnosis of peripheral tear of left lateral meniscus; Has had arthroscopy on left knee in the past; c/o left knee pain with ROM , swelling and weakness; Patient walks with limp. He presents to PST for planned left knee arthroscopy and partial lateral meniscectomy       Plan:  1. PST instructions given ; NPO status/  instructions to be given by ASU   2. Pt instructed to take following meds on day of surgery: none  3. Pt instructed to take routine evening medications unless indicated   4. Stop NSAIDS ( Aspirin Alev Motrin Mobic Diclofenac), herbal supplements , MVI , Vitamin fish oil 7 days prior to surgery  unless   directed by surgeon or cardiologist;   5. Medical Optimization  with Dr Barrera   6. EZ wash instructions given   7. CBC BMP  EKG  done

## 2025-05-05 NOTE — H&P PST ADULT - NSICDXPASTMEDICALHX_GEN_ALL_CORE_FT
PAST MEDICAL HISTORY:  Arthritis     Herniated cervical disc     Lumbar herniated disc cervical herniated disc    Tear of medial meniscus of left knee     Thoracic disc herniation     Torn meniscus

## 2025-05-05 NOTE — H&P PST ADULT - HISTORY OF PRESENT ILLNESS
65 year old male PMH significant for spinal disc disease s/p cervical, lumbar and thoracic fusion; Pre- op diagnosis of peripheral tear of left lateral meniscus; Has had arthroscopy on left knee in the past; c/o left knee pain with ROM , swelling and weakness; Patient walks with limp. He presents to PST for planned left knee arthroscopy and partial lateral meniscectomy

## 2025-05-06 DIAGNOSIS — Z01.818 ENCOUNTER FOR OTHER PREPROCEDURAL EXAMINATION: ICD-10-CM

## 2025-05-15 PROBLEM — S83.209A UNSPECIFIED TEAR OF UNSPECIFIED MENISCUS, CURRENT INJURY, UNSPECIFIED KNEE, INITIAL ENCOUNTER: Chronic | Status: ACTIVE | Noted: 2025-05-05

## 2025-05-19 ENCOUNTER — NON-APPOINTMENT (OUTPATIENT)
Age: 65
End: 2025-05-19

## 2025-05-20 ENCOUNTER — OUTPATIENT (OUTPATIENT)
Dept: INPATIENT UNIT | Facility: HOSPITAL | Age: 65
LOS: 1 days | Discharge: HOME | End: 2025-05-20
Payer: COMMERCIAL

## 2025-05-20 ENCOUNTER — APPOINTMENT (OUTPATIENT)
Dept: ORTHOPEDIC SURGERY | Facility: HOSPITAL | Age: 65
End: 2025-05-20
Payer: OTHER MISCELLANEOUS

## 2025-05-20 ENCOUNTER — TRANSCRIPTION ENCOUNTER (OUTPATIENT)
Age: 65
End: 2025-05-20

## 2025-05-20 ENCOUNTER — RESULT REVIEW (OUTPATIENT)
Age: 65
End: 2025-05-20

## 2025-05-20 VITALS
DIASTOLIC BLOOD PRESSURE: 93 MMHG | RESPIRATION RATE: 16 BRPM | OXYGEN SATURATION: 100 % | WEIGHT: 190.04 LBS | SYSTOLIC BLOOD PRESSURE: 155 MMHG | HEART RATE: 50 BPM | TEMPERATURE: 98 F | HEIGHT: 71 IN

## 2025-05-20 VITALS
DIASTOLIC BLOOD PRESSURE: 88 MMHG | TEMPERATURE: 97 F | HEART RATE: 48 BPM | OXYGEN SATURATION: 99 % | RESPIRATION RATE: 16 BRPM | SYSTOLIC BLOOD PRESSURE: 136 MMHG

## 2025-05-20 DIAGNOSIS — Z98.890 OTHER SPECIFIED POSTPROCEDURAL STATES: Chronic | ICD-10-CM

## 2025-05-20 DIAGNOSIS — Z96.612 PRESENCE OF LEFT ARTIFICIAL SHOULDER JOINT: Chronic | ICD-10-CM

## 2025-05-20 DIAGNOSIS — Z90.89 ACQUIRED ABSENCE OF OTHER ORGANS: Chronic | ICD-10-CM

## 2025-05-20 DIAGNOSIS — Z98.1 ARTHRODESIS STATUS: Chronic | ICD-10-CM

## 2025-05-20 DIAGNOSIS — S83.262D PERIPHERAL TEAR OF LATERAL MENISCUS, CURRENT INJURY, LEFT KNEE, SUBSEQUENT ENCOUNTER: ICD-10-CM

## 2025-05-20 PROCEDURE — 29880 ARTHRS KNE SRG MNISECTMY M&L: CPT | Mod: LT

## 2025-05-20 PROCEDURE — 88304 TISSUE EXAM BY PATHOLOGIST: CPT

## 2025-05-20 PROCEDURE — 88304 TISSUE EXAM BY PATHOLOGIST: CPT | Mod: 26

## 2025-05-20 RX ORDER — ASPIRIN 325 MG
1 TABLET ORAL
Qty: 10 | Refills: 0
Start: 2025-05-20 | End: 2025-05-29

## 2025-05-20 RX ORDER — OXYCODONE HYDROCHLORIDE 30 MG/1
5 TABLET ORAL ONCE
Refills: 0 | Status: DISCONTINUED | OUTPATIENT
Start: 2025-05-20 | End: 2025-05-20

## 2025-05-20 RX ORDER — OXYCODONE HYDROCHLORIDE 30 MG/1
1 TABLET ORAL
Qty: 20 | Refills: 0
Start: 2025-05-20

## 2025-05-20 RX ORDER — FENTANYL CITRATE-0.9 % NACL/PF 100MCG/2ML
50 SYRINGE (ML) INTRAVENOUS
Refills: 0 | Status: DISCONTINUED | OUTPATIENT
Start: 2025-05-20 | End: 2025-05-20

## 2025-05-20 RX ORDER — HYDROMORPHONE/SOD CHLOR,ISO/PF 2 MG/10 ML
0.5 SYRINGE (ML) INJECTION
Refills: 0 | Status: DISCONTINUED | OUTPATIENT
Start: 2025-05-20 | End: 2025-05-20

## 2025-05-20 RX ORDER — SODIUM CHLORIDE 9 G/1000ML
1000 INJECTION, SOLUTION INTRAVENOUS
Refills: 0 | Status: DISCONTINUED | OUTPATIENT
Start: 2025-05-20 | End: 2025-05-20

## 2025-05-20 RX ORDER — ONDANSETRON HCL/PF 4 MG/2 ML
4 VIAL (ML) INJECTION ONCE
Refills: 0 | Status: DISCONTINUED | OUTPATIENT
Start: 2025-05-20 | End: 2025-05-20

## 2025-05-20 RX ORDER — B1/B2/B3/B5/B6/B12/VIT C/FOLIC 500-0.5 MG
0 TABLET ORAL
Refills: 0 | DISCHARGE

## 2025-05-20 RX ADMIN — Medication 50 MICROGRAM(S): at 09:19

## 2025-05-20 RX ADMIN — Medication 50 MICROGRAM(S): at 09:07

## 2025-05-20 RX ADMIN — Medication 0.5 MILLIGRAM(S): at 09:47

## 2025-05-20 RX ADMIN — OXYCODONE HYDROCHLORIDE 5 MILLIGRAM(S): 30 TABLET ORAL at 09:07

## 2025-05-20 RX ADMIN — Medication 50 MICROGRAM(S): at 09:20

## 2025-05-20 RX ADMIN — OXYCODONE HYDROCHLORIDE 5 MILLIGRAM(S): 30 TABLET ORAL at 09:30

## 2025-05-20 RX ADMIN — Medication 50 MICROGRAM(S): at 08:58

## 2025-05-20 RX ADMIN — Medication 50 MICROGRAM(S): at 09:45

## 2025-05-20 RX ADMIN — Medication 50 MICROGRAM(S): at 09:08

## 2025-05-20 NOTE — BRIEF OPERATIVE NOTE - NSICDXBRIEFPOSTOP_GEN_ALL_CORE_FT
POST-OP DIAGNOSIS:  Tear of medial meniscus of left knee 20-May-2025 08:54:57 and lateral Juno Coronado

## 2025-05-20 NOTE — ASU DISCHARGE PLAN (ADULT/PEDIATRIC) - FINANCIAL ASSISTANCE
[facility] provides services at a reduced cost to those who are determined to be eligible through [facility]’s financial assistance program. Information regarding [facility]’s financial assistance program can be found by going to [email or web address] or by calling [phone].

## 2025-05-20 NOTE — BRIEF OPERATIVE NOTE - NSICDXBRIEFPREOP_GEN_ALL_CORE_FT
PRE-OP DIAGNOSIS:  Tear of medial meniscus of left knee 20-May-2025 08:54:48 and lateral Juno Coronado

## 2025-05-23 LAB — SURGICAL PATHOLOGY STUDY: SIGNIFICANT CHANGE UP

## 2025-05-27 DIAGNOSIS — M23.222 DERANGEMENT OF POSTERIOR HORN OF MEDIAL MENISCUS DUE TO OLD TEAR OR INJURY, LEFT KNEE: ICD-10-CM

## 2025-05-27 DIAGNOSIS — M23.252 DERANGEMENT OF POSTERIOR HORN OF LATERAL MENISCUS DUE TO OLD TEAR OR INJURY, LEFT KNEE: ICD-10-CM

## 2025-05-27 DIAGNOSIS — Z96.612 PRESENCE OF LEFT ARTIFICIAL SHOULDER JOINT: ICD-10-CM

## 2025-06-03 ENCOUNTER — APPOINTMENT (OUTPATIENT)
Facility: CLINIC | Age: 65
End: 2025-06-03
Payer: OTHER MISCELLANEOUS

## 2025-06-03 DIAGNOSIS — S83.262D PERIPHERAL TEAR OF LATERAL MENISCUS, CURRENT INJURY, LEFT KNEE, SUBSEQUENT ENCOUNTER: ICD-10-CM

## 2025-06-03 PROCEDURE — 99024 POSTOP FOLLOW-UP VISIT: CPT

## 2025-06-30 ENCOUNTER — APPOINTMENT (OUTPATIENT)
Facility: CLINIC | Age: 65
End: 2025-06-30
Payer: OTHER MISCELLANEOUS

## 2025-06-30 VITALS — HEIGHT: 71 IN | BODY MASS INDEX: 26.6 KG/M2 | WEIGHT: 190 LBS

## 2025-06-30 PROCEDURE — 99024 POSTOP FOLLOW-UP VISIT: CPT

## 2025-09-09 ENCOUNTER — NON-APPOINTMENT (OUTPATIENT)
Age: 65
End: 2025-09-09

## 2025-09-17 ENCOUNTER — APPOINTMENT (OUTPATIENT)
Facility: CLINIC | Age: 65
End: 2025-09-17
Payer: OTHER MISCELLANEOUS

## 2025-09-17 VITALS — HEIGHT: 71 IN | BODY MASS INDEX: 26.6 KG/M2 | WEIGHT: 190 LBS

## 2025-09-17 DIAGNOSIS — M17.32 UNILATERAL POST-TRAUMATIC OSTEOARTHRITIS, LEFT KNEE: ICD-10-CM

## 2025-09-17 DIAGNOSIS — S83.232A COMPLEX TEAR OF MEDIAL MENISCUS, CURRENT INJURY, LEFT KNEE, INITIAL ENCOUNTER: ICD-10-CM

## 2025-09-17 PROCEDURE — 99213 OFFICE O/P EST LOW 20 MIN: CPT
